# Patient Record
Sex: MALE | Race: WHITE | NOT HISPANIC OR LATINO | Employment: OTHER | URBAN - NONMETROPOLITAN AREA
[De-identification: names, ages, dates, MRNs, and addresses within clinical notes are randomized per-mention and may not be internally consistent; named-entity substitution may affect disease eponyms.]

---

## 2017-02-27 RX ORDER — LISINOPRIL AND HYDROCHLOROTHIAZIDE 20; 12.5 MG/1; MG/1
TABLET ORAL
Qty: 180 TABLET | Refills: 3 | Status: SHIPPED | OUTPATIENT
Start: 2017-02-27 | End: 2018-06-04 | Stop reason: SDUPTHER

## 2017-02-27 RX ORDER — PRAVASTATIN SODIUM 80 MG/1
TABLET ORAL
Qty: 90 TABLET | Refills: 3 | Status: SHIPPED | OUTPATIENT
Start: 2017-02-27 | End: 2018-06-04 | Stop reason: SDUPTHER

## 2017-04-14 RX ORDER — CITALOPRAM 40 MG/1
40 TABLET ORAL DAILY
Qty: 30 TABLET | Refills: 0 | Status: SHIPPED | OUTPATIENT
Start: 2017-04-14 | End: 2017-05-26 | Stop reason: SDUPTHER

## 2017-05-31 RX ORDER — GLIMEPIRIDE 4 MG/1
TABLET ORAL
Qty: 180 TABLET | Refills: 5 | Status: SHIPPED | OUTPATIENT
Start: 2017-05-31 | End: 2018-06-04 | Stop reason: SDUPTHER

## 2017-05-31 RX ORDER — CITALOPRAM 40 MG/1
TABLET ORAL
Qty: 90 TABLET | Refills: 0 | Status: SHIPPED | OUTPATIENT
Start: 2017-05-31 | End: 2017-08-29 | Stop reason: SDUPTHER

## 2017-09-08 RX ORDER — CITALOPRAM 40 MG/1
TABLET ORAL
Qty: 90 TABLET | Refills: 0 | Status: SHIPPED | OUTPATIENT
Start: 2017-09-08 | End: 2018-05-14 | Stop reason: ALTCHOICE

## 2017-12-07 RX ORDER — CITALOPRAM 40 MG/1
TABLET ORAL
Qty: 90 TABLET | Refills: 0 | OUTPATIENT
Start: 2017-12-07

## 2018-04-30 DIAGNOSIS — I10 ESSENTIAL HYPERTENSION: ICD-10-CM

## 2018-04-30 DIAGNOSIS — E78.5 HYPERLIPIDEMIA, UNSPECIFIED HYPERLIPIDEMIA TYPE: ICD-10-CM

## 2018-04-30 DIAGNOSIS — Z12.5 SPECIAL SCREENING FOR MALIGNANT NEOPLASM OF PROSTATE: ICD-10-CM

## 2018-04-30 DIAGNOSIS — E11.9 TYPE 2 DIABETES MELLITUS WITHOUT COMPLICATION, WITHOUT LONG-TERM CURRENT USE OF INSULIN (HCC): Primary | ICD-10-CM

## 2018-05-14 ENCOUNTER — OFFICE VISIT (OUTPATIENT)
Dept: FAMILY MEDICINE CLINIC | Facility: CLINIC | Age: 53
End: 2018-05-14

## 2018-05-14 ENCOUNTER — LAB (OUTPATIENT)
Dept: LAB | Facility: OTHER | Age: 53
End: 2018-05-14

## 2018-05-14 VITALS
HEIGHT: 60 IN | SYSTOLIC BLOOD PRESSURE: 104 MMHG | WEIGHT: 188.6 LBS | TEMPERATURE: 98.6 F | DIASTOLIC BLOOD PRESSURE: 68 MMHG | BODY MASS INDEX: 37.03 KG/M2 | HEART RATE: 92 BPM

## 2018-05-14 DIAGNOSIS — F41.1 GENERALIZED ANXIETY DISORDER: Chronic | ICD-10-CM

## 2018-05-14 DIAGNOSIS — E11.9 TYPE 2 DIABETES MELLITUS WITHOUT COMPLICATION, WITHOUT LONG-TERM CURRENT USE OF INSULIN (HCC): ICD-10-CM

## 2018-05-14 DIAGNOSIS — F10.11 HISTORY OF ALCOHOL ABUSE: Chronic | ICD-10-CM

## 2018-05-14 DIAGNOSIS — E78.5 HYPERLIPIDEMIA, UNSPECIFIED HYPERLIPIDEMIA TYPE: ICD-10-CM

## 2018-05-14 DIAGNOSIS — N52.9 VASCULOGENIC ERECTILE DYSFUNCTION, UNSPECIFIED VASCULOGENIC ERECTILE DYSFUNCTION TYPE: Chronic | ICD-10-CM

## 2018-05-14 DIAGNOSIS — E78.2 MIXED HYPERLIPIDEMIA: Chronic | ICD-10-CM

## 2018-05-14 DIAGNOSIS — E11.42 TYPE 2 DIABETES MELLITUS WITH DIABETIC POLYNEUROPATHY, WITHOUT LONG-TERM CURRENT USE OF INSULIN (HCC): Chronic | ICD-10-CM

## 2018-05-14 DIAGNOSIS — I10 ESSENTIAL HYPERTENSION: ICD-10-CM

## 2018-05-14 DIAGNOSIS — F33.2 SEVERE EPISODE OF RECURRENT MAJOR DEPRESSIVE DISORDER, WITHOUT PSYCHOTIC FEATURES (HCC): Primary | Chronic | ICD-10-CM

## 2018-05-14 DIAGNOSIS — I10 ESSENTIAL HYPERTENSION: Chronic | ICD-10-CM

## 2018-05-14 DIAGNOSIS — Z12.5 SPECIAL SCREENING FOR MALIGNANT NEOPLASM OF PROSTATE: ICD-10-CM

## 2018-05-14 PROBLEM — F32.9 MAJOR DEPRESSIVE DISORDER WITHOUT PSYCHOTIC FEATURES: Chronic | Status: ACTIVE | Noted: 2018-05-14

## 2018-05-14 LAB
ALBUMIN SERPL-MCNC: 4.1 G/DL (ref 3.2–5.5)
ALBUMIN UR-MCNC: 0.9 MG/L
ALBUMIN/GLOB SERPL: 1.3 G/DL (ref 1–3)
ALP SERPL-CCNC: 83 U/L (ref 15–121)
ALT SERPL W P-5'-P-CCNC: 18 U/L (ref 10–60)
ANION GAP SERPL CALCULATED.3IONS-SCNC: 13 MMOL/L (ref 5–15)
AST SERPL-CCNC: 19 U/L (ref 10–60)
BASOPHILS # BLD AUTO: 0.09 10*3/MM3 (ref 0–0.2)
BASOPHILS NFR BLD AUTO: 1.1 % (ref 0–2)
BILIRUB SERPL-MCNC: 1.1 MG/DL (ref 0.2–1)
BUN BLD-MCNC: 22 MG/DL (ref 8–25)
BUN/CREAT SERPL: 16.9 (ref 7–25)
CALCIUM SPEC-SCNC: 9.9 MG/DL (ref 8.4–10.8)
CHLORIDE SERPL-SCNC: 92 MMOL/L (ref 100–112)
CHOLEST SERPL-MCNC: 197 MG/DL (ref 150–200)
CO2 SERPL-SCNC: 27 MMOL/L (ref 20–32)
CREAT BLD-MCNC: 1.3 MG/DL (ref 0.4–1.3)
CREAT UR-MCNC: 85 MG/DL
DEPRECATED RDW RBC AUTO: 35.6 FL (ref 35.1–43.9)
EOSINOPHIL # BLD AUTO: 0.2 10*3/MM3 (ref 0–0.7)
EOSINOPHIL NFR BLD AUTO: 2.5 % (ref 0–7)
ERYTHROCYTE [DISTWIDTH] IN BLOOD BY AUTOMATED COUNT: 12.2 % (ref 11.5–14.5)
GFR SERPL CREATININE-BSD FRML MDRD: 58 ML/MIN/1.73 (ref 56–130)
GLOBULIN UR ELPH-MCNC: 3.1 GM/DL (ref 2.5–4.6)
GLUCOSE BLD-MCNC: 366 MG/DL (ref 70–100)
HBA1C MFR BLD: 14.3 % (ref 4–5.6)
HCT VFR BLD AUTO: 39.5 % (ref 39–49)
HDLC SERPL-MCNC: 49 MG/DL (ref 35–100)
HGB BLD-MCNC: 13.9 G/DL (ref 13.7–17.3)
LDLC SERPL CALC-MCNC: 105 MG/DL
LDLC/HDLC SERPL: 2.14 {RATIO}
LYMPHOCYTES # BLD AUTO: 2.21 10*3/MM3 (ref 0.6–4.2)
LYMPHOCYTES NFR BLD AUTO: 27.2 % (ref 10–50)
MCH RBC QN AUTO: 28.9 PG (ref 26.5–34)
MCHC RBC AUTO-ENTMCNC: 35.2 G/DL (ref 31.5–36.3)
MCV RBC AUTO: 82.1 FL (ref 80–98)
MICROALBUMIN/CREAT UR: 10.6 MG/G (ref 0–30)
MONOCYTES # BLD AUTO: 0.5 10*3/MM3 (ref 0–0.9)
MONOCYTES NFR BLD AUTO: 6.2 % (ref 0–12)
NEUTROPHILS # BLD AUTO: 5.13 10*3/MM3 (ref 2–8.6)
NEUTROPHILS NFR BLD AUTO: 63 % (ref 37–80)
PLATELET # BLD AUTO: 198 10*3/MM3 (ref 150–450)
PMV BLD AUTO: 10 FL (ref 8–12)
POTASSIUM BLD-SCNC: 4.3 MMOL/L (ref 3.4–5.4)
PROT SERPL-MCNC: 7.2 G/DL (ref 6.7–8.2)
RBC # BLD AUTO: 4.81 10*6/MM3 (ref 4.37–5.74)
SODIUM BLD-SCNC: 132 MMOL/L (ref 134–146)
TRIGL SERPL-MCNC: 216 MG/DL (ref 35–160)
VLDLC SERPL-MCNC: 43.2 MG/DL
WBC NRBC COR # BLD: 8.13 10*3/MM3 (ref 3.2–9.8)

## 2018-05-14 PROCEDURE — G0103 PSA SCREENING: HCPCS | Performed by: INTERNAL MEDICINE

## 2018-05-14 PROCEDURE — 83036 HEMOGLOBIN GLYCOSYLATED A1C: CPT | Performed by: INTERNAL MEDICINE

## 2018-05-14 PROCEDURE — 82570 ASSAY OF URINE CREATININE: CPT | Performed by: INTERNAL MEDICINE

## 2018-05-14 PROCEDURE — 82043 UR ALBUMIN QUANTITATIVE: CPT | Performed by: INTERNAL MEDICINE

## 2018-05-14 PROCEDURE — 80061 LIPID PANEL: CPT | Performed by: INTERNAL MEDICINE

## 2018-05-14 PROCEDURE — 84443 ASSAY THYROID STIM HORMONE: CPT | Performed by: INTERNAL MEDICINE

## 2018-05-14 PROCEDURE — 36415 COLL VENOUS BLD VENIPUNCTURE: CPT | Performed by: INTERNAL MEDICINE

## 2018-05-14 PROCEDURE — 85025 COMPLETE CBC W/AUTO DIFF WBC: CPT | Performed by: INTERNAL MEDICINE

## 2018-05-14 PROCEDURE — 99214 OFFICE O/P EST MOD 30 MIN: CPT | Performed by: INTERNAL MEDICINE

## 2018-05-14 PROCEDURE — 80053 COMPREHEN METABOLIC PANEL: CPT | Performed by: INTERNAL MEDICINE

## 2018-05-14 RX ORDER — ESCITALOPRAM OXALATE 20 MG/1
20 TABLET ORAL DAILY
Qty: 30 TABLET | Refills: 11 | Status: SHIPPED | OUTPATIENT
Start: 2018-05-14 | End: 2018-06-04 | Stop reason: SDUPTHER

## 2018-05-14 NOTE — PROGRESS NOTES
Subjective          History of Present Illness     Wesly Menezes is a 52 y.o. Male presents today to establish care.  He was a prior patient of Dr. Florentin Child.  His past medical history is significant for type 2 diabetes diagnosed approximately age 44, hyperlipidemia, hypertension, depression/OFELIA, and other medical issues.  He has stage II chronic kidney disease with baseline creatinine 1.3.  He describes mild symptoms of diabetic peripheral neuropathy, but declines medication for that.  He reports gradually worsening erectile dysfunction.    He works as a coal .  He attends [facility].    It appears that his diabetes has historically been poorly controlled.  Hemoglobin A1c has been greater than 10 for the past couple years.  Today's hemoglobin A1c is 14.3.  He reports compliance with metformin and Amaryl.  He has not been monitoring glucose regularly.  We have provided prescription for refills of his test strips today and asked him to start monitoring regularly.    His blood pressure is well controlled today.  He denies orthostatic symptoms.  He reports that he has lost about 60 pounds of weight over the course of the past year through diet changes and the stress of a divorce.    He reports a fairly long history of depression and anxiety.  He has been on Celexa for the past few years, but has been out of Celexa for the past 2 weeks.  He is tearful as we talk.  Makes good eye contact and has relatively good insight.  He denies SI or SA.  He denies bipolar cycling.  He denies auditory or visual hallucinations.  He contracts to do no harm.  He is being counseled by his new  at [facility] a regular basis.  His divorce has been very devastating to him.  He reports that he still wants to reconcile with his ex-wife.  He feels rather hopeless.  He is describing inadequate control of both depression and anxiety currently.  He sleeps fairly well on some nights, but not others.   "He has difficulty staying asleep at times.    He reports that he started drinking alcohol excessively when his mother-in-law and then mother both became terminally ill.  He reports that the heavy drinking continued and became a major problem in his marriage.  His wife left him when he was unable to keep his promises to stop drinking entirely.  When she did leave him, he finally stopped drinking.  He reports that he has maintained sobriety for 8 months now.  He has been on some clonazepam in the past, and I encouraged him to have a goal of avoiding use of benzodiazepines due to his history of alcohol abuse.    Review of Systems   Constitutional: Negative for chills, fatigue and fever.   HENT: Negative for congestion, ear pain, postnasal drip, sinus pressure and sore throat.    Respiratory: Negative for cough, shortness of breath and wheezing.    Cardiovascular: Negative for chest pain, palpitations and leg swelling.   Gastrointestinal: Negative for abdominal pain, blood in stool, constipation, diarrhea, nausea and vomiting.   Endocrine: Negative for cold intolerance, heat intolerance, polydipsia and polyuria.   Genitourinary: Negative for dysuria, frequency, hematuria and urgency.   Skin: Negative for rash.   Neurological: Negative for syncope and weakness.   Psychiatric/Behavioral: Positive for decreased concentration and dysphoric mood. The patient is nervous/anxious.         Objective     Vitals:    05/14/18 1506   BP: 104/68   Pulse: 92   Temp: 98.6 °F (37 °C)   TempSrc: Oral   Weight: 85.5 kg (188 lb 9.6 oz)   Height: 69 cm (27.17\")     Physical Exam   Constitutional: He is oriented to person, place, and time. He appears well-developed and well-nourished. No distress.   HENT:   Head: Normocephalic and atraumatic.   Nose: Right sinus exhibits no maxillary sinus tenderness and no frontal sinus tenderness. Left sinus exhibits no maxillary sinus tenderness and no frontal sinus tenderness.   Mouth/Throat: Uvula is " midline, oropharynx is clear and moist and mucous membranes are normal. No oral lesions. No tonsillar exudate.   Eyes: Conjunctivae and EOM are normal. Pupils are equal, round, and reactive to light.   Neck: Trachea normal. Neck supple. No JVD present. Carotid bruit is not present. No tracheal deviation present. No thyroid mass and no thyromegaly present.   Cardiovascular: Normal rate, regular rhythm, normal heart sounds and intact distal pulses.   No extrasystoles are present. PMI is not displaced.    No murmur heard.  Pulmonary/Chest: Effort normal and breath sounds normal. No accessory muscle usage. No respiratory distress. He has no decreased breath sounds. He has no wheezes. He has no rhonchi. He has no rales.   Abdominal: Soft. Bowel sounds are normal. He exhibits no distension. There is no hepatosplenomegaly. There is no tenderness.     Vascular Status -  His right foot exhibits normal foot vasculature  and no edema. His left foot exhibits normal foot vasculature  and no edema.  Lymphadenopathy:     He has no cervical adenopathy.   Neurological: He is alert and oriented to person, place, and time. No cranial nerve deficit. Coordination normal.   Skin: Skin is warm, dry and intact. No rash noted. No cyanosis. Nails show no clubbing.   Psychiatric: His speech is normal and behavior is normal. Judgment and thought content normal.   He looks and acts depressed and anxious.  He is not particularly agitated.  Makes good eye contact and is very  sincere and wanting to improve his current condition.  Reasonable insight.  No vasomotor retardation.  He is frequently tearful as we talk about his situation.   Vitals reviewed.          Assessment/Plan      Start Lexapro 20 mg.  Take one half tablet for the first 4 days, then 1 tablet daily.  Continue counseling with his .  Continue his current Bible study.  Refrain from all alcohol products.  We will likely add a second agent when he returns in 2 weeks, perhaps  Remeron or trazodone.  He contracts to seek immediate help should he start experiencing any thoughts of harming himself or others.  He does not think he needs any current inpatient treatment.    Continue the current dose of metformin and Amaryl.  Add Toujeo insulin 10 units every morning.  Monitor glucose twice daily and bring diabetic diary when he returns in 2 weeks.  I would like to check a C-peptide in the future.    Continue the current dose of lisinopril HCT.  At his current lower body weight, a dose reduction may be indicated soon.    Continue the pravastatin and dietary efforts.    I gave him samples of Cialis.  If they are helpful, we will provide a prescription for an ED drug when he returns in 2 weeks.    Return to clinic in 2 weeks with diabetic diary.  The patient appears to be overdue for health maintenance issues such as baseline colonoscopy.  We will want to address that in the future.    Scribed for Dr. Cadena by Yoko Johnson [facility].     Diagnoses and all orders for this visit:    Severe episode of recurrent major depressive disorder, without psychotic features    Generalized anxiety disorder    Type 2 diabetes mellitus with diabetic polyneuropathy, without long-term current use of insulin  -     Microalbumin / Creatinine Urine Ratio - Urine, Clean Catch; Future  -     TSH; Future    Essential hypertension    Mixed hyperlipidemia    Other orders  -     escitalopram (LEXAPRO) 20 MG tablet; Take 1 tablet by mouth Daily.        No visits with results within 3 Week(s) from this visit.   Latest known visit with results is:   Hospital Outpatient Visit on 06/20/2016   Component Date Value Ref Range Status   • WBC 06/20/2016 9.9* 3.2 - 9.8 x1000/uL Final   • RBC 06/20/2016 4.42  4.37 - 5.74 cassie/mm3 Final   • Hemoglobin 06/20/2016 13.9  13.7 - 17.3 gm/dl Final   • Hematocrit 06/20/2016 38.2* 39.0 - 49.0 % Final   • MCV 06/20/2016 86.4  80.0 - 98.0 fl Final   • MCH 06/20/2016 31.4  26.0 - 34.0 pg Final    • MCHC 06/20/2016 36.4* 31.5 - 36.3 gm/dl Final   • Platelets 06/20/2016 208  150 - 450 x1000/mm3 Final   • RDW 06/20/2016 12.0  11.5 - 14.5 % Final   • MPV 06/20/2016 10.5  8.0 - 12.0 fl Final   • Neutrophil Rel % 06/20/2016 78.2  37.0 - 80.0 % Final   • Lymphocyte Rel % 06/20/2016 14.9  10.0 - 50.0 % Final   • Monocyte Rel % 06/20/2016 5.5  0.0 - 12.0 % Final   • Eosinophil Rel % 06/20/2016 0.7  0.0 - 7.0 % Final   • Basophil Rel % 06/20/2016 0.7  0.0 - 2.0 % Final   • nRBC 06/20/2016 0   Final   • nRBC 06/20/2016 0   Final   • Protein, UA 06/20/2016 NEGATIVE  NEGATIVE Final   • Glucose 06/20/2016 412* 70.0 - 100.0 mg/dl Final   • BUN 06/20/2016 30* 8.0 - 25.0 mg/dl Final   • Creatinine 06/20/2016 1.6* 0.4 - 1.3 mg/dl Final   • Sodium 06/20/2016 134.0  134 - 146 mmol/L Final   • Potassium 06/20/2016 6.0* 3.4 - 5.4 mmol/L Final   • Chloride 06/20/2016 96.0* 100.0 - 112.0 mmol/L Final   • CO2 06/20/2016 26.0  20.0 - 32.0 mmol/L Final   • Calcium 06/20/2016 10.3  8.4 - 10.8 mg/dl Final   • Total Protein 06/20/2016 8.0  6.7 - 8.2 gm/dl Final   • Albumin 06/20/2016 4.5  3.2 - 5.5 gm/dl Final   • Total Bilirubin 06/20/2016 1.3* 0.2 - 1.0 mg/dl Final   • Alkaline Phosphatase 06/20/2016 77  15 - 121 U/L Final   • ALT (SGPT) 06/20/2016 39  10 - 60 U/L Final   • AST (SGOT) 06/20/2016 39  10 - 60 U/L Final   • GFR MDRD Non  06/20/2016 46* 56 - 130 mL/min/1.73 sq.M Final    Comment: Invalid if creatinine is changing or the patient is on dialysis. Use AA  result if patient is -American, non AA result otherwise.     • GFR MDRD  06/20/2016 55* 56 - 130 mL/min/1.73 sq.M Final   • Anion Gap 06/20/2016 12.0  5.0 - 15.0 mmol/L Final   • Total Cholesterol 06/20/2016 197  150 - 200 mg/dl Final   • Triglycerides 06/20/2016 322* 35 - 160 mg/dl Final   • HDL Cholesterol 06/20/2016 42.3  35.0 - 100.0 mg/dl Final   • LDL Cholesterol  06/20/2016 90  mg/dl Final    Comment: LDL DESIRED: < 130  MG/DL  LDL DESIRED: < 130 MG/DL     • Free T4 06/21/2016 1.41  0.78 - 2.19 ng/dl Final   • Barbiturates Screen, Urine 06/21/2016 Negative  Negative Final   • Benzodiazepine Screen, Urine 06/21/2016 Negative  Negative Final   • Opiate Screen, Urine 06/21/2016 Negative  Negative Final   • THC Screen Interpretation 06/21/2016 Negative  Negative Final   • Amphet/Methamphet, Screen, Urine 06/21/2016 Negative  Negative Final   • Cocaine Screen, Urine 06/21/2016 Negative  Negative Final   • Oxycodone Screen, Urine 06/21/2016 Negative  Negative Final   • Methadone Screen, Urine 06/21/2016 Negative  Negative Final    Comment: CUTOFF VALUES  Benzo     200 ng/mL   Aslhey        200 ng/mL           Cocaine   150 ng/mL   Amph      500 ng/mL  THC          20 ng/mL  Opiates    300 ng/mL  Oxycod    100 ng/mL  Mthadone 300 ng/mL       • TSH 06/21/2016 2.93  0.46 - 4.68 uIU/ml Final   • Hemoglobin A1C 06/21/2016 12.6* 4.0 - 5.6 %TotHgb Final   ]

## 2018-05-15 ENCOUNTER — TELEPHONE (OUTPATIENT)
Dept: FAMILY MEDICINE CLINIC | Facility: CLINIC | Age: 53
End: 2018-05-15

## 2018-05-15 DIAGNOSIS — Z79.4 TYPE 2 DIABETES MELLITUS WITHOUT COMPLICATION, WITH LONG-TERM CURRENT USE OF INSULIN (HCC): Primary | ICD-10-CM

## 2018-05-15 DIAGNOSIS — E11.9 TYPE 2 DIABETES MELLITUS WITHOUT COMPLICATION, WITH LONG-TERM CURRENT USE OF INSULIN (HCC): Primary | ICD-10-CM

## 2018-05-15 PROBLEM — N52.9 VASCULOGENIC ERECTILE DYSFUNCTION: Chronic | Status: ACTIVE | Noted: 2018-05-15

## 2018-05-15 PROBLEM — F10.11 HISTORY OF ALCOHOL ABUSE: Chronic | Status: ACTIVE | Noted: 2018-05-15

## 2018-05-15 LAB
PSA SERPL-MCNC: 0.47 NG/ML (ref 0–4)
TSH SERPL DL<=0.05 MIU/L-ACNC: 2.49 MIU/ML (ref 0.46–4.68)

## 2018-05-15 RX ORDER — BLOOD-GLUCOSE METER
KIT MISCELLANEOUS
Qty: 1 EACH | Refills: 0 | Status: SHIPPED | OUTPATIENT
Start: 2018-05-15 | End: 2018-06-22 | Stop reason: SDUPTHER

## 2018-05-15 NOTE — TELEPHONE ENCOUNTER
Explained results to patient and new instructions. He will  sample of Toujeo and I will send in [facility] to [facility]. Sent script for new glucometer and test strips to [facility]. Faxed a copy of insurance card to [facility] per patient request. TP    ----- Message from Jono Cadena MD sent at 5/15/2018 12:43 PM CDT -----  Notify the patient that his blood sugar averages even higher than it was last year.  Glucose has been averaging about 300 for the past 3 months.  Continue the current dose of metformin and Amaryl.  Add Toujeo 10 units every morning.  See if he can come by so that you can demonstrate how to use it properly.  Have him monitor glucose twice daily and bring diabetic diary when he returns in 2-3 weeks.  EB

## 2018-06-04 ENCOUNTER — OFFICE VISIT (OUTPATIENT)
Dept: FAMILY MEDICINE CLINIC | Facility: CLINIC | Age: 53
End: 2018-06-04

## 2018-06-04 VITALS
HEIGHT: 69 IN | HEART RATE: 82 BPM | SYSTOLIC BLOOD PRESSURE: 110 MMHG | OXYGEN SATURATION: 98 % | WEIGHT: 193 LBS | BODY MASS INDEX: 28.58 KG/M2 | DIASTOLIC BLOOD PRESSURE: 74 MMHG

## 2018-06-04 DIAGNOSIS — F33.2 SEVERE EPISODE OF RECURRENT MAJOR DEPRESSIVE DISORDER, WITHOUT PSYCHOTIC FEATURES (HCC): Chronic | ICD-10-CM

## 2018-06-04 DIAGNOSIS — E11.42 TYPE 2 DIABETES MELLITUS WITH DIABETIC POLYNEUROPATHY, WITHOUT LONG-TERM CURRENT USE OF INSULIN (HCC): Primary | Chronic | ICD-10-CM

## 2018-06-04 DIAGNOSIS — I10 ESSENTIAL HYPERTENSION: Chronic | ICD-10-CM

## 2018-06-04 DIAGNOSIS — N52.9 VASCULOGENIC ERECTILE DYSFUNCTION, UNSPECIFIED VASCULOGENIC ERECTILE DYSFUNCTION TYPE: Chronic | ICD-10-CM

## 2018-06-04 DIAGNOSIS — E78.2 MIXED HYPERLIPIDEMIA: Chronic | ICD-10-CM

## 2018-06-04 DIAGNOSIS — F41.1 GENERALIZED ANXIETY DISORDER: Chronic | ICD-10-CM

## 2018-06-04 PROCEDURE — 99214 OFFICE O/P EST MOD 30 MIN: CPT | Performed by: INTERNAL MEDICINE

## 2018-06-04 RX ORDER — PRAVASTATIN SODIUM 80 MG/1
80 TABLET ORAL DAILY
Qty: 90 TABLET | Refills: 3 | Status: SHIPPED | OUTPATIENT
Start: 2018-06-04 | End: 2018-06-22 | Stop reason: SDUPTHER

## 2018-06-04 RX ORDER — ESCITALOPRAM OXALATE 20 MG/1
20 TABLET ORAL DAILY
Qty: 30 TABLET | Refills: 11 | Status: SHIPPED | OUTPATIENT
Start: 2018-06-04 | End: 2018-06-22 | Stop reason: SDUPTHER

## 2018-06-04 RX ORDER — SILDENAFIL 100 MG/1
100 TABLET, FILM COATED ORAL DAILY PRN
Qty: 6 TABLET | Refills: 12 | Status: SHIPPED | OUTPATIENT
Start: 2018-06-04 | End: 2018-06-04 | Stop reason: ALTCHOICE

## 2018-06-04 RX ORDER — LISINOPRIL AND HYDROCHLOROTHIAZIDE 20; 12.5 MG/1; MG/1
1 TABLET ORAL 2 TIMES DAILY
Qty: 180 TABLET | Refills: 3 | Status: SHIPPED | OUTPATIENT
Start: 2018-06-04 | End: 2018-06-22 | Stop reason: SDUPTHER

## 2018-06-04 RX ORDER — GLIMEPIRIDE 4 MG/1
4 TABLET ORAL 2 TIMES DAILY
Qty: 180 TABLET | Refills: 5 | Status: SHIPPED | OUTPATIENT
Start: 2018-06-04 | End: 2018-06-22 | Stop reason: SDUPTHER

## 2018-06-04 RX ORDER — SILDENAFIL CITRATE 20 MG/1
TABLET ORAL
Qty: 12 TABLET | Refills: 11 | Status: SHIPPED | OUTPATIENT
Start: 2018-06-04 | End: 2018-06-22 | Stop reason: SDUPTHER

## 2018-06-04 NOTE — PROGRESS NOTES
Subjective        History of Present Illness     Wesly Menezes is a 52 y.o. male who comes in today for a follow up on issues addressed at his initial visit.  He was a prior patient of Dr. Florentin Child and established care here three weeks ago.  His medical issues include type 2 diabetes, hyperlipidemia, hypertension, depression/OFELIA, and other medical issues.  He has stage II chronic kidney disease with baseline creatinine 1.3.      His diabetes has historically been poorly controlled with hemoglobin A1c greater than 10 for the past couple years.  Most current hemoglobin A1c dated 05/14/18 was 14.3.  When he was here three weeks ago, I had him continue metformin and high-dose Amaryl and I added Toujeo insulin 10 units every morning.  He did not bring in a diary today, but reports range of 132-188 in the morning with further improvement later in the day.  He is feeling much better.  He eats cereal with milk at night as a snack and reports he notices consistent elevations each morning.  He also reports eating a high carbohydrate breakfast such as blueberry muffin.  We reviewed principles of diabetic diet.       We started him on Lexapro for anxiety, grief, and depression with his last visit and he reports he has already noticed more than 50% improvement and is sleeping well.  We discussed how he may not see full results with the Lexapro for 2-3 more weeks.  He has been attending Sikhism and participating in Bible Study.  He is receiving counseling from his .  He attends [facility].     He reports good results with the samples of Cialis we gave at his initial visit for erectile dysfunction.  We are giving him a prescription for sildenafil today.                Review of Systems   Constitutional: Negative for chills, fatigue and fever.   HENT: Negative for congestion, ear pain, postnasal drip, sinus pressure and sore throat.    Respiratory: Negative for cough, shortness of breath and wheezing.   "  Cardiovascular: Negative for chest pain, palpitations and leg swelling.   Gastrointestinal: Negative for abdominal pain, blood in stool, constipation, diarrhea, nausea and vomiting.   Endocrine: Negative for cold intolerance, heat intolerance, polydipsia and polyuria.   Genitourinary: Negative for dysuria, frequency, hematuria and urgency.   Skin: Negative for rash.   Neurological: Negative for syncope and weakness.        Objective     Vitals:    06/04/18 1546   BP: 110/74   Pulse: 82   SpO2: 98%   Weight: 87.5 kg (193 lb)   Height: 175.3 cm (69\")     Physical Exam   Constitutional: He is oriented to person, place, and time. He appears well-developed and well-nourished. No distress.   HENT:   Head: Normocephalic and atraumatic.   Nose: Right sinus exhibits no maxillary sinus tenderness and no frontal sinus tenderness. Left sinus exhibits no maxillary sinus tenderness and no frontal sinus tenderness.   Mouth/Throat: Uvula is midline, oropharynx is clear and moist and mucous membranes are normal. No oral lesions. No tonsillar exudate.   Eyes: Conjunctivae and EOM are normal. Pupils are equal, round, and reactive to light.   Neck: Trachea normal. Neck supple. No JVD present. Carotid bruit is not present. No tracheal deviation present. No thyroid mass and no thyromegaly present.   Cardiovascular: Normal rate, regular rhythm, normal heart sounds and intact distal pulses.   No extrasystoles are present. PMI is not displaced.    No murmur heard.  Pulmonary/Chest: Effort normal and breath sounds normal. No accessory muscle usage. No respiratory distress. He has no decreased breath sounds. He has no wheezes. He has no rhonchi. He has no rales.   Abdominal: Soft. Bowel sounds are normal. He exhibits no distension. There is no hepatosplenomegaly. There is no tenderness.     Vascular Status -  His right foot exhibits normal foot vasculature  and no edema. His left foot exhibits normal foot vasculature  and no " edema.  Lymphadenopathy:     He has no cervical adenopathy.   Neurological: He is alert and oriented to person, place, and time. No cranial nerve deficit. Coordination normal.   Skin: Skin is warm, dry and intact. No rash noted. No cyanosis. Nails show no clubbing.   Psychiatric: He has a normal mood and affect. His speech is normal and behavior is normal. Judgment and thought content normal.   Vitals reviewed.      Assessment/Plan      Continue Lexapro 20 mg. Continue counseling and Bible study.  Refrain from all alcohol products.    Continue the current dose of metformin and Amaryl.  Continue Toujeo insulin 10 units every morning.  I will ultimately want to decrease Amaryl and perhaps add other oral agents.   Monitor glucose twice daily and keep diabetic diary.  He will return in two months with diabetic diary.  I want to check a C-peptide with his next set of labs.    Start sildenafil 20 mg 2-3 tablets one hour prior to intercourse as needed for erectile dysfunction.      Scribed for Dr. Cadena by Yoko Johnson [facility].     Diagnoses and all orders for this visit:    Type 2 diabetes mellitus with diabetic polyneuropathy, without long-term current use of insulin  -     C-Peptide; Future  -     CBC Auto Differential; Future  -     Comprehensive Metabolic Panel; Future  -     Hemoglobin A1c; Future    Essential hypertension    Generalized anxiety disorder    Severe episode of recurrent major depressive disorder, without psychotic features    Vasculogenic erectile dysfunction, unspecified vasculogenic erectile dysfunction type    Mixed hyperlipidemia  -     Lipid Panel; Future    Other orders  -     Discontinue: sildenafil (VIAGRA) 100 MG tablet; Take 1 tablet by mouth Daily As Needed for erectile dysfunction.  -     glimepiride (AMARYL) 4 MG tablet; Take 1 tablet by mouth 2 (Two) Times a Day.  -     lisinopril-hydrochlorothiazide (PRINZIDE,ZESTORETIC) 20-12.5 MG per tablet; Take 1 tablet by mouth 2 (Two) Times a  Day.  -     metFORMIN (GLUCOPHAGE) 1000 MG tablet; Take 1 tablet by mouth 2 (Two) Times a Day.  -     pravastatin (PRAVACHOL) 80 MG tablet; Take 1 tablet by mouth Daily.  -     Insulin Glargine 300 UNIT/ML solution pen-injector; Inject 10 Units under the skin Every Morning.  -     escitalopram (LEXAPRO) 20 MG tablet; Take 1 tablet by mouth Daily.  -     sildenafil (REVATIO) 20 MG tablet; 2-3 tablets by mouth 1 hour prior to intercourse        No visits with results within 3 Week(s) from this visit.   Latest known visit with results is:   Lab on 05/14/2018   Component Date Value Ref Range Status   • Microalbumin/Creatinine Ratio 05/14/2018 10.6  0.0 - 30.0 mg/g Final   • Creatinine, Urine 05/14/2018 85.0  mg/dL Final   • Microalbumin, Urine 05/14/2018 0.9  mg/L Final   • TSH 05/14/2018 2.490  0.460 - 4.680 mIU/mL Final   • Glucose 05/14/2018 366* 70 - 100 mg/dL Final   • BUN 05/14/2018 22  8 - 25 mg/dL Final   • Creatinine 05/14/2018 1.30  0.40 - 1.30 mg/dL Final   • Sodium 05/14/2018 132* 134 - 146 mmol/L Final   • Potassium 05/14/2018 4.3  3.4 - 5.4 mmol/L Final   • Chloride 05/14/2018 92* 100 - 112 mmol/L Final   • CO2 05/14/2018 27.0  20.0 - 32.0 mmol/L Final   • Calcium 05/14/2018 9.9  8.4 - 10.8 mg/dL Final   • Total Protein 05/14/2018 7.2  6.7 - 8.2 g/dL Final   • Albumin 05/14/2018 4.10  3.20 - 5.50 g/dL Final   • ALT (SGPT) 05/14/2018 18  10 - 60 U/L Final   • AST (SGOT) 05/14/2018 19  10 - 60 U/L Final   • Alkaline Phosphatase 05/14/2018 83  15 - 121 U/L Final   • Total Bilirubin 05/14/2018 1.1* 0.2 - 1.0 mg/dL Final   • eGFR Non African Amer 05/14/2018 58  56 - 130 mL/min/1.73 Final   • Globulin 05/14/2018 3.1  2.5 - 4.6 gm/dL Final   • A/G Ratio 05/14/2018 1.3  1.0 - 3.0 g/dL Final   • BUN/Creatinine Ratio 05/14/2018 16.9  7.0 - 25.0 Final   • Anion Gap 05/14/2018 13.0  5.0 - 15.0 mmol/L Final   • Hemoglobin A1C 05/14/2018 14.3* 4 - 5.6 % Final   • Total Cholesterol 05/14/2018 197  150 - 200 mg/dL Final    • Triglycerides 05/14/2018 216* 35 - 160 mg/dL Final   • HDL Cholesterol 05/14/2018 49  35 - 100 mg/dL Final   • LDL Cholesterol  05/14/2018 105  mg/dL Final   • VLDL Cholesterol 05/14/2018 43.2  mg/dL Final   • LDL/HDL Ratio 05/14/2018 2.14   Final   • PSA 05/14/2018 0.475  0.000 - 4.000 ng/mL Final   • WBC 05/14/2018 8.13  3.20 - 9.80 10*3/mm3 Final   • RBC 05/14/2018 4.81  4.37 - 5.74 10*6/mm3 Final   • Hemoglobin 05/14/2018 13.9  13.7 - 17.3 g/dL Final   • Hematocrit 05/14/2018 39.5  39.0 - 49.0 % Final   • MCV 05/14/2018 82.1  80.0 - 98.0 fL Final   • MCH 05/14/2018 28.9  26.5 - 34.0 pg Final   • MCHC 05/14/2018 35.2  31.5 - 36.3 g/dL Final   • RDW 05/14/2018 12.2  11.5 - 14.5 % Final   • RDW-SD 05/14/2018 35.6  35.1 - 43.9 fl Final   • MPV 05/14/2018 10.0  8.0 - 12.0 fL Final   • Platelets 05/14/2018 198  150 - 450 10*3/mm3 Final   • Neutrophil % 05/14/2018 63.0  37.0 - 80.0 % Final   • Lymphocyte % 05/14/2018 27.2  10.0 - 50.0 % Final   • Monocyte % 05/14/2018 6.2  0.0 - 12.0 % Final   • Eosinophil % 05/14/2018 2.5  0.0 - 7.0 % Final   • Basophil % 05/14/2018 1.1  0.0 - 2.0 % Final   • Neutrophils, Absolute 05/14/2018 5.13  2.00 - 8.60 10*3/mm3 Final   • Lymphocytes, Absolute 05/14/2018 2.21  0.60 - 4.20 10*3/mm3 Final   • Monocytes, Absolute 05/14/2018 0.50  0.00 - 0.90 10*3/mm3 Final   • Eosinophils, Absolute 05/14/2018 0.20  0.00 - 0.70 10*3/mm3 Final   • Basophils, Absolute 05/14/2018 0.09  0.00 - 0.20 10*3/mm3 Final   ]

## 2018-06-22 DIAGNOSIS — Z79.4 TYPE 2 DIABETES MELLITUS WITHOUT COMPLICATION, WITH LONG-TERM CURRENT USE OF INSULIN (HCC): ICD-10-CM

## 2018-06-22 DIAGNOSIS — E11.9 TYPE 2 DIABETES MELLITUS WITHOUT COMPLICATION, WITH LONG-TERM CURRENT USE OF INSULIN (HCC): ICD-10-CM

## 2018-06-22 RX ORDER — PRAVASTATIN SODIUM 80 MG/1
80 TABLET ORAL DAILY
Qty: 90 TABLET | Refills: 3 | Status: SHIPPED | OUTPATIENT
Start: 2018-06-22 | End: 2018-06-22 | Stop reason: SDUPTHER

## 2018-06-22 RX ORDER — GLIMEPIRIDE 4 MG/1
4 TABLET ORAL 2 TIMES DAILY
Qty: 180 TABLET | Refills: 1 | Status: SHIPPED | OUTPATIENT
Start: 2018-06-22 | End: 2018-06-22 | Stop reason: SDUPTHER

## 2018-06-22 RX ORDER — SILDENAFIL CITRATE 20 MG/1
TABLET ORAL
Qty: 12 TABLET | Refills: 11 | Status: SHIPPED | OUTPATIENT
Start: 2018-06-22 | End: 2019-02-26 | Stop reason: SDUPTHER

## 2018-06-22 RX ORDER — GLIMEPIRIDE 4 MG/1
4 TABLET ORAL 2 TIMES DAILY
Qty: 60 TABLET | Refills: 1 | Status: SHIPPED | OUTPATIENT
Start: 2018-06-22 | End: 2018-09-25 | Stop reason: SDUPTHER

## 2018-06-22 RX ORDER — LISINOPRIL AND HYDROCHLOROTHIAZIDE 20; 12.5 MG/1; MG/1
1 TABLET ORAL 2 TIMES DAILY
Qty: 180 TABLET | Refills: 3 | Status: SHIPPED | OUTPATIENT
Start: 2018-06-22 | End: 2018-06-22 | Stop reason: SDUPTHER

## 2018-06-22 RX ORDER — BLOOD-GLUCOSE METER
KIT MISCELLANEOUS
Qty: 1 EACH | Refills: 0 | Status: SHIPPED | OUTPATIENT
Start: 2018-06-22 | End: 2020-07-16 | Stop reason: SDUPTHER

## 2018-06-22 RX ORDER — ESCITALOPRAM OXALATE 20 MG/1
20 TABLET ORAL DAILY
Qty: 90 TABLET | Refills: 1 | Status: SHIPPED | OUTPATIENT
Start: 2018-06-22 | End: 2018-06-22 | Stop reason: SDUPTHER

## 2018-06-22 RX ORDER — PRAVASTATIN SODIUM 80 MG/1
80 TABLET ORAL DAILY
Qty: 30 TABLET | Refills: 0 | Status: SHIPPED | OUTPATIENT
Start: 2018-06-22 | End: 2018-08-24 | Stop reason: SDUPTHER

## 2018-06-22 RX ORDER — ESCITALOPRAM OXALATE 20 MG/1
20 TABLET ORAL DAILY
Qty: 30 TABLET | Refills: 0 | Status: SHIPPED | OUTPATIENT
Start: 2018-06-22 | End: 2019-02-26 | Stop reason: SDUPTHER

## 2018-06-22 RX ORDER — LISINOPRIL AND HYDROCHLOROTHIAZIDE 20; 12.5 MG/1; MG/1
1 TABLET ORAL 2 TIMES DAILY
Qty: 60 TABLET | Refills: 0 | Status: SHIPPED | OUTPATIENT
Start: 2018-06-22 | End: 2018-08-24 | Stop reason: SDUPTHER

## 2018-06-22 RX ORDER — SILDENAFIL CITRATE 20 MG/1
TABLET ORAL
Qty: 12 TABLET | Refills: 11 | Status: SHIPPED | OUTPATIENT
Start: 2018-06-22 | End: 2018-08-24 | Stop reason: SDUPTHER

## 2018-08-24 ENCOUNTER — OFFICE VISIT (OUTPATIENT)
Dept: FAMILY MEDICINE CLINIC | Facility: CLINIC | Age: 53
End: 2018-08-24

## 2018-08-24 VITALS
HEART RATE: 67 BPM | SYSTOLIC BLOOD PRESSURE: 146 MMHG | BODY MASS INDEX: 31.25 KG/M2 | WEIGHT: 211 LBS | DIASTOLIC BLOOD PRESSURE: 78 MMHG | HEIGHT: 69 IN | TEMPERATURE: 98 F

## 2018-08-24 DIAGNOSIS — E66.09 CLASS 1 OBESITY DUE TO EXCESS CALORIES WITH SERIOUS COMORBIDITY AND BODY MASS INDEX (BMI) OF 31.0 TO 31.9 IN ADULT: Chronic | ICD-10-CM

## 2018-08-24 DIAGNOSIS — F33.2 SEVERE EPISODE OF RECURRENT MAJOR DEPRESSIVE DISORDER, WITHOUT PSYCHOTIC FEATURES (HCC): Chronic | ICD-10-CM

## 2018-08-24 DIAGNOSIS — E11.42 TYPE 2 DIABETES MELLITUS WITH DIABETIC POLYNEUROPATHY, WITHOUT LONG-TERM CURRENT USE OF INSULIN (HCC): Primary | Chronic | ICD-10-CM

## 2018-08-24 DIAGNOSIS — G89.29 CHRONIC BILATERAL LOW BACK PAIN WITHOUT SCIATICA: Chronic | ICD-10-CM

## 2018-08-24 DIAGNOSIS — E11.42 TYPE 2 DIABETES MELLITUS WITH DIABETIC POLYNEUROPATHY, WITHOUT LONG-TERM CURRENT USE OF INSULIN (HCC): Chronic | ICD-10-CM

## 2018-08-24 DIAGNOSIS — I10 ESSENTIAL HYPERTENSION: Chronic | ICD-10-CM

## 2018-08-24 DIAGNOSIS — M54.50 CHRONIC BILATERAL LOW BACK PAIN WITHOUT SCIATICA: Chronic | ICD-10-CM

## 2018-08-24 DIAGNOSIS — R73.9 HYPERGLYCEMIA: ICD-10-CM

## 2018-08-24 DIAGNOSIS — F41.1 GENERALIZED ANXIETY DISORDER: Chronic | ICD-10-CM

## 2018-08-24 DIAGNOSIS — F10.11 HISTORY OF ALCOHOL ABUSE: Chronic | ICD-10-CM

## 2018-08-24 PROCEDURE — 99214 OFFICE O/P EST MOD 30 MIN: CPT | Performed by: INTERNAL MEDICINE

## 2018-08-24 RX ORDER — PRAVASTATIN SODIUM 80 MG/1
80 TABLET ORAL DAILY
Qty: 30 TABLET | Refills: 5 | Status: SHIPPED | OUTPATIENT
Start: 2018-08-24 | End: 2018-08-24 | Stop reason: SDUPTHER

## 2018-08-24 RX ORDER — NAPROXEN 500 MG/1
500 TABLET ORAL 2 TIMES DAILY WITH MEALS
Qty: 60 TABLET | Refills: 3 | Status: SHIPPED | OUTPATIENT
Start: 2018-08-24 | End: 2019-01-04 | Stop reason: SDUPTHER

## 2018-08-24 RX ORDER — LISINOPRIL AND HYDROCHLOROTHIAZIDE 20; 12.5 MG/1; MG/1
1 TABLET ORAL 2 TIMES DAILY
Qty: 60 TABLET | Refills: 5 | Status: SHIPPED | OUTPATIENT
Start: 2018-08-24 | End: 2019-02-26 | Stop reason: SDUPTHER

## 2018-08-24 RX ORDER — PRAVASTATIN SODIUM 80 MG/1
80 TABLET ORAL DAILY
Qty: 30 TABLET | Refills: 5 | Status: SHIPPED | OUTPATIENT
Start: 2018-08-24 | End: 2019-02-26 | Stop reason: SDUPTHER

## 2018-08-24 NOTE — PROGRESS NOTES
Subjective     History of Present Illness     Wesly Menezes is a 52 y.o. male with medical issues include type 2 diabetes, hyperlipidemia, hypertension, depression/OFELIA, and other medical issues who comes in for a 2-month follow up on diabetes mellitus.  He established care in May with historically poorly controlled diabetes.  Hemoglobin A1c has been greater than 10 for the past couple years and  A1c was 14.3 with labs at his initial visit in May.  I had him continue the current dose of metformin and Amaryl twice daily.  I added low dose Toujeo insulin 10 units every morning.   He reports compliance with medications and denies hypoglycemic episodes.  I asked him to keep a glucose diary and he comes in today to review diabetic diary.  Review of the diary reveals glucose is typically higher in the morning and lower in the evening with erractic morning numbers ranging from 121 to 246 with average of 175.  Evening numbers have been well controlled averaging in the 90s.  He admits that he sometimes misses doses of his medications, which usually results in the a.m. numbers above goal.  He eats the majority of calories later in the day.  I recommended he try dosing his Toujeo with his evening meal.  Weight is increased 18 pounds in the past two months.   We discussed the need to manage weight to help get diabetes at goal.  I recommended he weigh weekly and set a weight goal of under 200 pounds.      He reports anxiety, grief, and depression symptoms have been adequately controlled with the Lexapro 20 mg started for anxiety at his initial visit in May.       He has been taking two Aleve three times a day for chronic low back and right shoulder pain.  He denies radicular symptoms.  He has noticed occasional constipation.  He denies upper GI side effects.  He is asking if there is an alternative pain reliever he could take.  We are sending a prescription for naproxen sodium.          Blood pressure is at goal.     Review of  "Systems   Constitutional: Negative for chills, fatigue and fever.   HENT: Negative for congestion, ear pain, postnasal drip, sinus pressure and sore throat.    Respiratory: Negative for cough, shortness of breath and wheezing.    Cardiovascular: Negative for chest pain, palpitations and leg swelling.   Gastrointestinal: Negative for abdominal pain, blood in stool, constipation, diarrhea, nausea and vomiting.   Endocrine: Negative for cold intolerance, heat intolerance, polydipsia and polyuria.   Genitourinary: Negative for dysuria, frequency, hematuria and urgency.   Musculoskeletal: Positive for back pain.   Skin: Negative for rash.   Neurological: Negative for syncope and weakness.   Psychiatric/Behavioral: The patient is nervous/anxious.         Objective      Vitals:    08/24/18 1447   BP: 146/78   Pulse: 67   Temp: 98 °F (36.7 °C)   TempSrc: Oral   Weight: 95.7 kg (211 lb)   Height: 175.3 cm (69\")       Physical Exam   Constitutional: He is oriented to person, place, and time. He appears well-developed and well-nourished. No distress.   Obese male.    HENT:   Head: Normocephalic and atraumatic.   Nose: Nose normal.   Mouth/Throat: Oropharynx is clear and moist. No oropharyngeal exudate.   Eyes: Pupils are equal, round, and reactive to light. EOM are normal.   Neck: Neck supple. No JVD present. No thyromegaly present.   Cardiovascular: Normal rate, regular rhythm and normal heart sounds.    Pulmonary/Chest: Effort normal and breath sounds normal. No accessory muscle usage. No respiratory distress. He has no wheezes. He has no rales.   Abdominal: Soft. Bowel sounds are normal. He exhibits no distension. There is no tenderness.   Obese abdomen limits exam.    Musculoskeletal: He exhibits no edema.   Lymphadenopathy:     He has no cervical adenopathy.   Neurological: He is alert and oriented to person, place, and time. No cranial nerve deficit.   Psychiatric: He has a normal mood and affect. His speech is normal " and behavior is normal. Judgment and thought content normal.         Assessment/Plan      Continue with the current diabetic regimen including metformin, Amaryl, and low dose Toujeo 10 units daily.   He has been taking the Toujeo each morning.   However, I asked him to start taking the Toujeo with his evening meal to lower his morning glucose.  Continue to monitor glucose closely.  Pursue diet, exercise, and weight loss.  Weigh weekly with a goal of getting weight down to a goal of 200 pounds.        Continue with the Lexapro 20 mg daily.     A prescription is sent for naproxen sodium 500 mg to take one b.i.d with meals for the chronic back and shoulder pain.  He should avoid other NSAIDs while on the naproxen.       Continue other medications and return in three months for routine follow up with fasting labs one week prior.     Scribed for Dr. Cadena by Yoko Johnson [facility].     Diagnoses and all orders for this visit:    Type 2 diabetes mellitus with diabetic polyneuropathy, without long-term current use of insulin (CMS/[facility])  -     glucose blood test strip; Check two time daily E11.9  -     Insulin Pen Needle (BD ULTRA-FINE PEN NEEDLES) 29G X 12.7MM misc; Use daily with Toujeo pen  -     lisinopril-hydrochlorothiazide (PRINZIDE,ZESTORETIC) 20-12.5 MG per tablet; Take 1 tablet by mouth 2 (Two) Times a Day.  -     Lancets 30G misc; Check two time daily  E11.9  -     metFORMIN (GLUCOPHAGE) 1000 MG tablet; Take 1 tablet by mouth 2 (Two) Times a Day.  -     pravastatin (PRAVACHOL) 80 MG tablet; Take 1 tablet by mouth Daily.  -     naproxen (NAPROSYN) 500 MG tablet; Take 1 tablet by mouth 2 (Two) Times a Day With Meals.    Essential hypertension    Hyperglycemia    Chronic bilateral low back pain without sciatica    Generalized anxiety disorder    Severe episode of recurrent major depressive disorder, without psychotic features (CMS/[facility])    History of alcohol abuse    Class 1 obesity due to excess calories with  serious comorbidity and body mass index (BMI) of 31.0 to 31.9 in adult        No visits with results within 3 Week(s) from this visit.   Latest known visit with results is:   Lab on 05/14/2018   Component Date Value Ref Range Status   • Microalbumin/Creatinine Ratio 05/14/2018 10.6  0.0 - 30.0 mg/g Final   • Creatinine, Urine 05/14/2018 85.0  mg/dL Final   • Microalbumin, Urine 05/14/2018 0.9  mg/L Final   • TSH 05/14/2018 2.490  0.460 - 4.680 mIU/mL Final   • Glucose 05/14/2018 366* 70 - 100 mg/dL Final   • BUN 05/14/2018 22  8 - 25 mg/dL Final   • Creatinine 05/14/2018 1.30  0.40 - 1.30 mg/dL Final   • Sodium 05/14/2018 132* 134 - 146 mmol/L Final   • Potassium 05/14/2018 4.3  3.4 - 5.4 mmol/L Final   • Chloride 05/14/2018 92* 100 - 112 mmol/L Final   • CO2 05/14/2018 27.0  20.0 - 32.0 mmol/L Final   • Calcium 05/14/2018 9.9  8.4 - 10.8 mg/dL Final   • Total Protein 05/14/2018 7.2  6.7 - 8.2 g/dL Final   • Albumin 05/14/2018 4.10  3.20 - 5.50 g/dL Final   • ALT (SGPT) 05/14/2018 18  10 - 60 U/L Final   • AST (SGOT) 05/14/2018 19  10 - 60 U/L Final   • Alkaline Phosphatase 05/14/2018 83  15 - 121 U/L Final   • Total Bilirubin 05/14/2018 1.1* 0.2 - 1.0 mg/dL Final   • eGFR Non African Amer 05/14/2018 58  56 - 130 mL/min/1.73 Final   • Globulin 05/14/2018 3.1  2.5 - 4.6 gm/dL Final   • A/G Ratio 05/14/2018 1.3  1.0 - 3.0 g/dL Final   • BUN/Creatinine Ratio 05/14/2018 16.9  7.0 - 25.0 Final   • Anion Gap 05/14/2018 13.0  5.0 - 15.0 mmol/L Final   • Hemoglobin A1C 05/14/2018 14.3* 4 - 5.6 % Final   • Total Cholesterol 05/14/2018 197  150 - 200 mg/dL Final   • Triglycerides 05/14/2018 216* 35 - 160 mg/dL Final   • HDL Cholesterol 05/14/2018 49  35 - 100 mg/dL Final   • LDL Cholesterol  05/14/2018 105  mg/dL Final   • VLDL Cholesterol 05/14/2018 43.2  mg/dL Final   • LDL/HDL Ratio 05/14/2018 2.14   Final   • PSA 05/14/2018 0.475  0.000 - 4.000 ng/mL Final   • WBC 05/14/2018 8.13  3.20 - 9.80 10*3/mm3 Final   • RBC  05/14/2018 4.81  4.37 - 5.74 10*6/mm3 Final   • Hemoglobin 05/14/2018 13.9  13.7 - 17.3 g/dL Final   • Hematocrit 05/14/2018 39.5  39.0 - 49.0 % Final   • MCV 05/14/2018 82.1  80.0 - 98.0 fL Final   • MCH 05/14/2018 28.9  26.5 - 34.0 pg Final   • MCHC 05/14/2018 35.2  31.5 - 36.3 g/dL Final   • RDW 05/14/2018 12.2  11.5 - 14.5 % Final   • RDW-SD 05/14/2018 35.6  35.1 - 43.9 fl Final   • MPV 05/14/2018 10.0  8.0 - 12.0 fL Final   • Platelets 05/14/2018 198  150 - 450 10*3/mm3 Final   • Neutrophil % 05/14/2018 63.0  37.0 - 80.0 % Final   • Lymphocyte % 05/14/2018 27.2  10.0 - 50.0 % Final   • Monocyte % 05/14/2018 6.2  0.0 - 12.0 % Final   • Eosinophil % 05/14/2018 2.5  0.0 - 7.0 % Final   • Basophil % 05/14/2018 1.1  0.0 - 2.0 % Final   • Neutrophils, Absolute 05/14/2018 5.13  2.00 - 8.60 10*3/mm3 Final   • Lymphocytes, Absolute 05/14/2018 2.21  0.60 - 4.20 10*3/mm3 Final   • Monocytes, Absolute 05/14/2018 0.50  0.00 - 0.90 10*3/mm3 Final   • Eosinophils, Absolute 05/14/2018 0.20  0.00 - 0.70 10*3/mm3 Final   • Basophils, Absolute 05/14/2018 0.09  0.00 - 0.20 10*3/mm3 Final   ]

## 2018-08-24 NOTE — PATIENT INSTRUCTIONS
Diabetes Mellitus and Exercise  Exercising regularly is important for your overall health, especially when you have diabetes (diabetes mellitus). Exercising is not only about losing weight. It has many health benefits, such as increasing muscle strength and bone density and reducing body fat and stress. This leads to improved fitness, flexibility, and endurance, all of which result in better overall health.  Exercise has additional benefits for people with diabetes, including:  · Reducing appetite.  · Helping to lower and control blood glucose.  · Lowering blood pressure.  · Helping to control amounts of fatty substances (lipids) in the blood, such as cholesterol and triglycerides.  · Helping the body to respond better to insulin (improving insulin sensitivity).  · Reducing how much insulin the body needs.  · Decreasing the risk for heart disease by:  ? Lowering cholesterol and triglyceride levels.  ? Increasing the levels of good cholesterol.  ? Lowering blood glucose levels.    What is my activity plan?  Your health care provider or certified diabetes educator can help you make a plan for the type and frequency of exercise (activity plan) that works for you. Make sure that you:  · Do at least 150 minutes of moderate-intensity or vigorous-intensity exercise each week. This could be brisk walking, biking, or water aerobics.  ? Do stretching and strength exercises, such as yoga or weightlifting, at least 2 times a week.  ? Spread out your activity over at least 3 days of the week.  · Get some form of physical activity every day.  ? Do not go more than 2 days in a row without some kind of physical activity.  ? Avoid being inactive for more than 90 minutes at a time. Take frequent breaks to walk or stretch.  · Choose a type of exercise or activity that you enjoy, and set realistic goals.  · Start slowly, and gradually increase the intensity of your exercise over time.    What do I need to know about managing my  diabetes?  · Check your blood glucose before and after exercising.  ? If your blood glucose is higher than 240 mg/dL (13.3 mmol/L) before you exercise, check your urine for ketones. If you have ketones in your urine, do not exercise until your blood glucose returns to normal.  · Know the symptoms of low blood glucose (hypoglycemia) and how to treat it. Your risk for hypoglycemia increases during and after exercise. Common symptoms of hypoglycemia can include:  ? Hunger.  ? Anxiety.  ? Sweating and feeling clammy.  ? Confusion.  ? Dizziness or feeling light-headed.  ? Increased heart rate or palpitations.  ? Blurry vision.  ? Tingling or numbness around the mouth, lips, or tongue.  ? Tremors or shakes.  ? Irritability.  · Keep a rapid-acting carbohydrate snack available before, during, and after exercise to help prevent or treat hypoglycemia.  · Avoid injecting insulin into areas of the body that are going to be exercised. For example, avoid injecting insulin into:  ? The arms, when playing tennis.  ? The legs, when jogging.  · Keep records of your exercise habits. Doing this can help you and your health care provider adjust your diabetes management plan as needed. Write down:  ? Food that you eat before and after you exercise.  ? Blood glucose levels before and after you exercise.  ? The type and amount of exercise you have done.  ? When your insulin is expected to peak, if you use insulin. Avoid exercising at times when your insulin is peaking.  · When you start a new exercise or activity, work with your health care provider to make sure the activity is safe for you, and to adjust your insulin, medicines, or food intake as needed.  · Drink plenty of water while you exercise to prevent dehydration or heat stroke. Drink enough fluid to keep your urine clear or pale yellow.  This information is not intended to replace advice given to you by your health care provider. Make sure you discuss any questions you have with  your health care provider.  Document Released: 03/09/2005 Document Revised: 07/07/2017 Document Reviewed: 05/29/2017  Elsevier Interactive Patient Education © 2018 [employer]

## 2018-09-25 RX ORDER — GLIMEPIRIDE 4 MG/1
4 TABLET ORAL 2 TIMES DAILY
Qty: 60 TABLET | Refills: 5 | Status: SHIPPED | OUTPATIENT
Start: 2018-09-25 | End: 2019-02-26 | Stop reason: SDUPTHER

## 2018-11-30 ENCOUNTER — LAB (OUTPATIENT)
Dept: LAB | Facility: OTHER | Age: 53
End: 2018-11-30

## 2018-11-30 DIAGNOSIS — E11.42 TYPE 2 DIABETES MELLITUS WITH DIABETIC POLYNEUROPATHY, WITHOUT LONG-TERM CURRENT USE OF INSULIN (HCC): Chronic | ICD-10-CM

## 2018-11-30 DIAGNOSIS — E78.2 MIXED HYPERLIPIDEMIA: Chronic | ICD-10-CM

## 2018-11-30 LAB
ALBUMIN SERPL-MCNC: 4.4 G/DL (ref 3.5–5)
ALBUMIN/GLOB SERPL: 1.6 G/DL (ref 1.1–1.8)
ALP SERPL-CCNC: 58 U/L (ref 38–126)
ALT SERPL W P-5'-P-CCNC: 16 U/L
ANION GAP SERPL CALCULATED.3IONS-SCNC: 8 MMOL/L (ref 5–15)
AST SERPL-CCNC: 22 U/L (ref 17–59)
BASOPHILS # BLD AUTO: 0.06 10*3/MM3 (ref 0–0.2)
BASOPHILS NFR BLD AUTO: 1.1 % (ref 0–2)
BILIRUB SERPL-MCNC: 1.1 MG/DL (ref 0.2–1.3)
BUN BLD-MCNC: 13 MG/DL (ref 9–20)
BUN/CREAT SERPL: 10.7 (ref 7–25)
CALCIUM SPEC-SCNC: 9.4 MG/DL (ref 8.4–10.2)
CHLORIDE SERPL-SCNC: 103 MMOL/L (ref 98–107)
CHOLEST SERPL-MCNC: 143 MG/DL (ref 150–200)
CO2 SERPL-SCNC: 28 MMOL/L (ref 22–30)
CREAT BLD-MCNC: 1.21 MG/DL (ref 0.66–1.25)
DEPRECATED RDW RBC AUTO: 40.1 FL (ref 35.1–43.9)
EOSINOPHIL # BLD AUTO: 0.27 10*3/MM3 (ref 0–0.7)
EOSINOPHIL NFR BLD AUTO: 5 % (ref 0–7)
ERYTHROCYTE [DISTWIDTH] IN BLOOD BY AUTOMATED COUNT: 13.1 % (ref 11.5–14.5)
GFR SERPL CREATININE-BSD FRML MDRD: 62 ML/MIN/1.73 (ref 56–130)
GLOBULIN UR ELPH-MCNC: 2.7 GM/DL (ref 2.3–3.5)
GLUCOSE BLD-MCNC: 108 MG/DL (ref 74–99)
HBA1C MFR BLD: 7.5 % (ref 4–5.6)
HCT VFR BLD AUTO: 35.8 % (ref 39–49)
HDLC SERPL-MCNC: 66 MG/DL (ref 40–59)
HGB BLD-MCNC: 12.3 G/DL (ref 13.7–17.3)
LDLC SERPL CALC-MCNC: 63 MG/DL
LDLC/HDLC SERPL: 0.95 {RATIO} (ref 0–3.55)
LYMPHOCYTES # BLD AUTO: 1.74 10*3/MM3 (ref 0.6–4.2)
LYMPHOCYTES NFR BLD AUTO: 32.5 % (ref 10–50)
MCH RBC QN AUTO: 29.6 PG (ref 26.5–34)
MCHC RBC AUTO-ENTMCNC: 34.4 G/DL (ref 31.5–36.3)
MCV RBC AUTO: 86.1 FL (ref 80–98)
MONOCYTES # BLD AUTO: 0.63 10*3/MM3 (ref 0–0.9)
MONOCYTES NFR BLD AUTO: 11.8 % (ref 0–12)
NEUTROPHILS # BLD AUTO: 2.66 10*3/MM3 (ref 2–8.6)
NEUTROPHILS NFR BLD AUTO: 49.6 % (ref 37–80)
PLATELET # BLD AUTO: 171 10*3/MM3 (ref 150–450)
PMV BLD AUTO: 9.4 FL (ref 8–12)
POTASSIUM BLD-SCNC: 4.5 MMOL/L (ref 3.4–5)
PROT SERPL-MCNC: 7.1 G/DL (ref 6.3–8.2)
RBC # BLD AUTO: 4.16 10*6/MM3 (ref 4.37–5.74)
SODIUM BLD-SCNC: 139 MMOL/L (ref 137–145)
TRIGL SERPL-MCNC: 70 MG/DL
VLDLC SERPL-MCNC: 14 MG/DL
WBC NRBC COR # BLD: 5.36 10*3/MM3 (ref 3.2–9.8)

## 2018-11-30 PROCEDURE — 85025 COMPLETE CBC W/AUTO DIFF WBC: CPT | Performed by: INTERNAL MEDICINE

## 2018-11-30 PROCEDURE — 36415 COLL VENOUS BLD VENIPUNCTURE: CPT | Performed by: INTERNAL MEDICINE

## 2018-11-30 PROCEDURE — 80061 LIPID PANEL: CPT | Performed by: INTERNAL MEDICINE

## 2018-11-30 PROCEDURE — 83036 HEMOGLOBIN GLYCOSYLATED A1C: CPT | Performed by: INTERNAL MEDICINE

## 2018-11-30 PROCEDURE — 84681 ASSAY OF C-PEPTIDE: CPT | Performed by: INTERNAL MEDICINE

## 2018-11-30 PROCEDURE — 80053 COMPREHEN METABOLIC PANEL: CPT | Performed by: INTERNAL MEDICINE

## 2018-12-01 LAB — C PEPTIDE SERPL-MCNC: 1.4 NG/ML (ref 1.1–4.4)

## 2018-12-11 ENCOUNTER — OFFICE VISIT (OUTPATIENT)
Dept: FAMILY MEDICINE CLINIC | Facility: CLINIC | Age: 53
End: 2018-12-11

## 2018-12-11 VITALS
SYSTOLIC BLOOD PRESSURE: 138 MMHG | HEIGHT: 69 IN | HEART RATE: 80 BPM | WEIGHT: 222 LBS | DIASTOLIC BLOOD PRESSURE: 80 MMHG | BODY MASS INDEX: 32.88 KG/M2 | TEMPERATURE: 98.7 F

## 2018-12-11 DIAGNOSIS — E11.42 TYPE 2 DIABETES MELLITUS WITH DIABETIC POLYNEUROPATHY, WITHOUT LONG-TERM CURRENT USE OF INSULIN (HCC): Primary | Chronic | ICD-10-CM

## 2018-12-11 DIAGNOSIS — I10 ESSENTIAL HYPERTENSION: Chronic | ICD-10-CM

## 2018-12-11 DIAGNOSIS — F32.9 MAJOR DEPRESSIVE DISORDER WITHOUT PSYCHOTIC FEATURES: Chronic | ICD-10-CM

## 2018-12-11 DIAGNOSIS — F10.11 HISTORY OF ALCOHOL ABUSE: Chronic | ICD-10-CM

## 2018-12-11 DIAGNOSIS — E78.2 MIXED HYPERLIPIDEMIA: Chronic | ICD-10-CM

## 2018-12-11 DIAGNOSIS — Z12.5 SPECIAL SCREENING FOR MALIGNANT NEOPLASM OF PROSTATE: ICD-10-CM

## 2018-12-11 DIAGNOSIS — E66.09 CLASS 1 OBESITY DUE TO EXCESS CALORIES WITH SERIOUS COMORBIDITY AND BODY MASS INDEX (BMI) OF 31.0 TO 31.9 IN ADULT: Chronic | ICD-10-CM

## 2018-12-11 DIAGNOSIS — F41.1 GENERALIZED ANXIETY DISORDER: Chronic | ICD-10-CM

## 2018-12-11 PROCEDURE — 99214 OFFICE O/P EST MOD 30 MIN: CPT | Performed by: INTERNAL MEDICINE

## 2018-12-11 NOTE — PATIENT INSTRUCTIONS
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
## 2019-01-04 DIAGNOSIS — E11.42 TYPE 2 DIABETES MELLITUS WITH DIABETIC POLYNEUROPATHY, WITHOUT LONG-TERM CURRENT USE OF INSULIN (HCC): Chronic | ICD-10-CM

## 2019-01-04 RX ORDER — NAPROXEN 500 MG/1
500 TABLET ORAL 2 TIMES DAILY WITH MEALS
Qty: 60 TABLET | Refills: 3 | Status: SHIPPED | OUTPATIENT
Start: 2019-01-04 | End: 2019-02-26 | Stop reason: SDUPTHER

## 2019-02-26 DIAGNOSIS — E11.42 TYPE 2 DIABETES MELLITUS WITH DIABETIC POLYNEUROPATHY, WITHOUT LONG-TERM CURRENT USE OF INSULIN (HCC): Chronic | ICD-10-CM

## 2019-02-26 RX ORDER — NAPROXEN 500 MG/1
500 TABLET ORAL 2 TIMES DAILY WITH MEALS
Qty: 60 TABLET | Refills: 3 | Status: SHIPPED | OUTPATIENT
Start: 2019-02-26 | End: 2019-07-29 | Stop reason: SDUPTHER

## 2019-02-26 RX ORDER — ESCITALOPRAM OXALATE 20 MG/1
20 TABLET ORAL DAILY
Qty: 90 TABLET | Refills: 3 | Status: SHIPPED | OUTPATIENT
Start: 2019-02-26 | End: 2019-09-13 | Stop reason: SDUPTHER

## 2019-02-26 RX ORDER — LISINOPRIL AND HYDROCHLOROTHIAZIDE 20; 12.5 MG/1; MG/1
1 TABLET ORAL 2 TIMES DAILY
Qty: 180 TABLET | Refills: 3 | Status: SHIPPED | OUTPATIENT
Start: 2019-02-26 | End: 2020-01-07

## 2019-02-26 RX ORDER — PRAVASTATIN SODIUM 80 MG/1
80 TABLET ORAL DAILY
Qty: 90 TABLET | Refills: 3 | Status: SHIPPED | OUTPATIENT
Start: 2019-02-26 | End: 2019-04-19 | Stop reason: SDUPTHER

## 2019-02-26 RX ORDER — GLIMEPIRIDE 4 MG/1
4 TABLET ORAL 2 TIMES DAILY
Qty: 180 TABLET | Refills: 3 | Status: SHIPPED | OUTPATIENT
Start: 2019-02-26 | End: 2020-03-11

## 2019-02-26 RX ORDER — SILDENAFIL CITRATE 20 MG/1
TABLET ORAL
Qty: 36 TABLET | Refills: 3 | Status: SHIPPED | OUTPATIENT
Start: 2019-02-26

## 2019-03-22 DIAGNOSIS — E11.42 TYPE 2 DIABETES MELLITUS WITH DIABETIC POLYNEUROPATHY, WITHOUT LONG-TERM CURRENT USE OF INSULIN (HCC): Chronic | ICD-10-CM

## 2019-03-25 RX ORDER — PRAVASTATIN SODIUM 80 MG/1
80 TABLET ORAL DAILY
Qty: 30 TABLET | Refills: 5 | OUTPATIENT
Start: 2019-03-25

## 2019-04-19 DIAGNOSIS — E11.42 TYPE 2 DIABETES MELLITUS WITH DIABETIC POLYNEUROPATHY, WITHOUT LONG-TERM CURRENT USE OF INSULIN (HCC): Chronic | ICD-10-CM

## 2019-04-19 RX ORDER — PRAVASTATIN SODIUM 80 MG/1
80 TABLET ORAL DAILY
Qty: 30 TABLET | Refills: 5 | Status: SHIPPED | OUTPATIENT
Start: 2019-04-19 | End: 2019-10-11 | Stop reason: SDUPTHER

## 2019-06-20 ENCOUNTER — LAB (OUTPATIENT)
Dept: LAB | Facility: OTHER | Age: 54
End: 2019-06-20

## 2019-06-20 DIAGNOSIS — Z12.5 SPECIAL SCREENING FOR MALIGNANT NEOPLASM OF PROSTATE: ICD-10-CM

## 2019-06-20 DIAGNOSIS — E78.2 MIXED HYPERLIPIDEMIA: Chronic | ICD-10-CM

## 2019-06-20 DIAGNOSIS — E11.42 TYPE 2 DIABETES MELLITUS WITH DIABETIC POLYNEUROPATHY, WITHOUT LONG-TERM CURRENT USE OF INSULIN (HCC): Chronic | ICD-10-CM

## 2019-06-20 LAB
ALBUMIN SERPL-MCNC: 4.2 G/DL (ref 3.5–5)
ALBUMIN/GLOB SERPL: 1.4 G/DL (ref 1.1–1.8)
ALP SERPL-CCNC: 77 U/L (ref 38–126)
ALT SERPL W P-5'-P-CCNC: 16 U/L
ANION GAP SERPL CALCULATED.3IONS-SCNC: 12 MMOL/L (ref 5–15)
AST SERPL-CCNC: 20 U/L (ref 17–59)
BASOPHILS # BLD AUTO: 0.07 10*3/MM3 (ref 0–0.2)
BASOPHILS NFR BLD AUTO: 1 % (ref 0–1.5)
BILIRUB SERPL-MCNC: 1.1 MG/DL (ref 0.2–1.3)
BUN BLD-MCNC: 18 MG/DL (ref 7–23)
BUN/CREAT SERPL: 14.5 (ref 7–25)
CALCIUM SPEC-SCNC: 9.5 MG/DL (ref 8.4–10.2)
CHLORIDE SERPL-SCNC: 101 MMOL/L (ref 101–112)
CHOLEST SERPL-MCNC: 131 MG/DL (ref 150–200)
CO2 SERPL-SCNC: 29 MMOL/L (ref 22–30)
CREAT BLD-MCNC: 1.24 MG/DL (ref 0.7–1.3)
DEPRECATED RDW RBC AUTO: 38.8 FL (ref 37–54)
EOSINOPHIL # BLD AUTO: 0.2 10*3/MM3 (ref 0–0.4)
EOSINOPHIL NFR BLD AUTO: 2.9 % (ref 0.3–6.2)
ERYTHROCYTE [DISTWIDTH] IN BLOOD BY AUTOMATED COUNT: 12.7 % (ref 12.3–15.4)
GFR SERPL CREATININE-BSD FRML MDRD: 61 ML/MIN/1.73 (ref 56–130)
GLOBULIN UR ELPH-MCNC: 2.9 GM/DL (ref 2.3–3.5)
GLUCOSE BLD-MCNC: 219 MG/DL (ref 70–99)
HCT VFR BLD AUTO: 35.6 % (ref 37.5–51)
HDLC SERPL-MCNC: 50 MG/DL (ref 40–59)
HGB BLD-MCNC: 12.9 G/DL (ref 13–17.7)
LDLC SERPL CALC-MCNC: 62 MG/DL
LDLC/HDLC SERPL: 1.23 {RATIO} (ref 0–3.55)
LYMPHOCYTES # BLD AUTO: 1.81 10*3/MM3 (ref 0.7–3.1)
LYMPHOCYTES NFR BLD AUTO: 26.4 % (ref 19.6–45.3)
MCH RBC QN AUTO: 31.2 PG (ref 26.6–33)
MCHC RBC AUTO-ENTMCNC: 36.2 G/DL (ref 31.5–35.7)
MCV RBC AUTO: 86.2 FL (ref 79–97)
MONOCYTES # BLD AUTO: 0.57 10*3/MM3 (ref 0.1–0.9)
MONOCYTES NFR BLD AUTO: 8.3 % (ref 5–12)
NEUTROPHILS # BLD AUTO: 4.21 10*3/MM3 (ref 1.7–7)
NEUTROPHILS NFR BLD AUTO: 61.4 % (ref 42.7–76)
PLATELET # BLD AUTO: 181 10*3/MM3 (ref 140–450)
PMV BLD AUTO: 9.6 FL (ref 6–12)
POTASSIUM BLD-SCNC: 4.5 MMOL/L (ref 3.4–5)
PROT SERPL-MCNC: 7.1 G/DL (ref 6.3–8.6)
RBC # BLD AUTO: 4.13 10*6/MM3 (ref 4.14–5.8)
SODIUM BLD-SCNC: 142 MMOL/L (ref 137–145)
TRIGL SERPL-MCNC: 97 MG/DL
VLDLC SERPL-MCNC: 19.4 MG/DL
WBC NRBC COR # BLD: 6.86 10*3/MM3 (ref 3.4–10.8)

## 2019-06-20 PROCEDURE — 82570 ASSAY OF URINE CREATININE: CPT | Performed by: INTERNAL MEDICINE

## 2019-06-20 PROCEDURE — 80053 COMPREHEN METABOLIC PANEL: CPT | Performed by: INTERNAL MEDICINE

## 2019-06-20 PROCEDURE — 82043 UR ALBUMIN QUANTITATIVE: CPT | Performed by: INTERNAL MEDICINE

## 2019-06-20 PROCEDURE — 84443 ASSAY THYROID STIM HORMONE: CPT | Performed by: INTERNAL MEDICINE

## 2019-06-20 PROCEDURE — 80061 LIPID PANEL: CPT | Performed by: INTERNAL MEDICINE

## 2019-06-20 PROCEDURE — 83036 HEMOGLOBIN GLYCOSYLATED A1C: CPT | Performed by: INTERNAL MEDICINE

## 2019-06-20 PROCEDURE — G0103 PSA SCREENING: HCPCS | Performed by: INTERNAL MEDICINE

## 2019-06-20 PROCEDURE — 85025 COMPLETE CBC W/AUTO DIFF WBC: CPT | Performed by: INTERNAL MEDICINE

## 2019-06-20 PROCEDURE — 36415 COLL VENOUS BLD VENIPUNCTURE: CPT | Performed by: INTERNAL MEDICINE

## 2019-06-21 LAB
ALBUMIN UR-MCNC: 71.4 MG/L
CREAT UR-MCNC: 156.5 MG/DL
HBA1C MFR BLD: 7.76 % (ref 4.8–5.6)
MICROALBUMIN/CREAT UR: 456.2 MG/G
PSA SERPL-MCNC: 0.64 NG/ML (ref 0–4)
TSH SERPL DL<=0.05 MIU/L-ACNC: 2.12 MIU/ML (ref 0.27–4.2)

## 2019-07-01 ENCOUNTER — OFFICE VISIT (OUTPATIENT)
Dept: FAMILY MEDICINE CLINIC | Facility: CLINIC | Age: 54
End: 2019-07-01

## 2019-07-01 VITALS
HEIGHT: 69 IN | SYSTOLIC BLOOD PRESSURE: 154 MMHG | DIASTOLIC BLOOD PRESSURE: 98 MMHG | WEIGHT: 221 LBS | BODY MASS INDEX: 32.73 KG/M2 | TEMPERATURE: 98.3 F | HEART RATE: 88 BPM

## 2019-07-01 DIAGNOSIS — F10.11 HISTORY OF ALCOHOL ABUSE: Chronic | ICD-10-CM

## 2019-07-01 DIAGNOSIS — E78.2 MIXED HYPERLIPIDEMIA: Chronic | ICD-10-CM

## 2019-07-01 DIAGNOSIS — N52.9 VASCULOGENIC ERECTILE DYSFUNCTION, UNSPECIFIED VASCULOGENIC ERECTILE DYSFUNCTION TYPE: Chronic | ICD-10-CM

## 2019-07-01 DIAGNOSIS — E11.42 TYPE 2 DIABETES MELLITUS WITH DIABETIC POLYNEUROPATHY, WITHOUT LONG-TERM CURRENT USE OF INSULIN (HCC): Primary | Chronic | ICD-10-CM

## 2019-07-01 DIAGNOSIS — E66.09 CLASS 1 OBESITY DUE TO EXCESS CALORIES WITH SERIOUS COMORBIDITY AND BODY MASS INDEX (BMI) OF 31.0 TO 31.9 IN ADULT: Chronic | ICD-10-CM

## 2019-07-01 DIAGNOSIS — I10 ESSENTIAL HYPERTENSION: Chronic | ICD-10-CM

## 2019-07-01 DIAGNOSIS — F32.9 MAJOR DEPRESSIVE DISORDER WITHOUT PSYCHOTIC FEATURES: Chronic | ICD-10-CM

## 2019-07-01 DIAGNOSIS — F41.1 GENERALIZED ANXIETY DISORDER: Chronic | ICD-10-CM

## 2019-07-01 PROCEDURE — 99214 OFFICE O/P EST MOD 30 MIN: CPT | Performed by: INTERNAL MEDICINE

## 2019-07-01 NOTE — PROGRESS NOTES
Subjective        History of Present Illness     Wesly Menezes is a 53 y.o. male who comes in for 6-month follow up on medical issues include type 2 diabetes, hyperlipidemia, hypertension, depression/OFELIA, and other medical issues.   He has a history of alcohol abuse, but reports he has been abstaining from alcohol completely.  Denies tobacco use.      He established care in May 2018 with historically poorly controlled diabetes.  Hemoglobin A1c was 14.3 with labs at his initial visit in May.  I added low dose Toujeo insulin to his Amaryl and metformin with a vast improvement in diabetes management with A1c down from 14.3  to 7.5 six months ago with only a slight increase to 7.7 with current labs.  He describes episodic symptoms of hypoglycemia improved by eating a small snack.     With his visit six months ago, his father had recently passed away and he had stopped taking his Lexapro daily due to excessive sedation with a whole tablet.  I recommended he try taking a half tablet to see if this is less sedating.  He continues skipping doses of the Lxapro taking a dose on days he feels more anxious.  I once again encouraged daily use of the Lexapro.  He attended [employer] in the past, although, he has not been attending [employer] since his father passed away.  I encouraged him to get back in [employer] where he can have support of friends.     Weight is up 11 pounds, although, he has steel toe boots on.  Blood pressure above goal today at 154/98.  He has not taken his blood pressure medication today and reports he sometimes misses his morning medications on days he is not working and sleeping in.  He reports systolic blood pressure is normally around 130 at home.     .  The patient's relevant past medical, surgical, and social history was reviewed in Epic.   Lab results are reviewed with the patient today.  CBC unremarkable.  Glucose was 219 with  A1c 7.7 increased from 7.5 six months ago, but vastly improved from  "14.3 a year ago.  He reports he had eaten a muffin a few hours prior.  Total cholesterol 131. HDL 50.  LDL 62.  Triglycerides 97 with LDL/HDL ratio 1.23.    Normal PSA.  Normal renal and liver function.     Review of Systems   Constitutional: Negative for chills, fatigue and fever.   HENT: Negative for congestion, ear pain, postnasal drip, sinus pressure and sore throat.    Respiratory: Negative for cough, shortness of breath and wheezing.    Cardiovascular: Negative for chest pain, palpitations and leg swelling.   Gastrointestinal: Negative for abdominal pain, blood in stool, constipation, diarrhea, nausea and vomiting.   Endocrine: Negative for cold intolerance, heat intolerance, polydipsia and polyuria.   Genitourinary: Negative for dysuria, frequency, hematuria and urgency.   Skin: Negative for rash.   Neurological: Negative for syncope and weakness.        Objective      Vitals:    07/01/19 1527   BP: 154/98   Pulse: 88   Temp: 98.3 °F (36.8 °C)   TempSrc: Oral   Weight: 100 kg (221 lb)   Height: 175.3 cm (69\")       Physical Exam   Constitutional: He is oriented to person, place, and time. He appears well-developed and well-nourished. No distress.   Obese female.    HENT:   Head: Normocephalic and atraumatic.   Nose: Right sinus exhibits no maxillary sinus tenderness and no frontal sinus tenderness. Left sinus exhibits no maxillary sinus tenderness and no frontal sinus tenderness.   Mouth/Throat: Uvula is midline, oropharynx is clear and moist and mucous membranes are normal. No oral lesions. No tonsillar exudate.   Eyes: Conjunctivae and EOM are normal. Pupils are equal, round, and reactive to light.   Neck: Trachea normal. Neck supple. No JVD present. Carotid bruit is not present. No tracheal deviation present. No thyroid mass and no thyromegaly present.   Cardiovascular: Normal rate, regular rhythm, normal heart sounds and intact distal pulses.  No extrasystoles are present. PMI is not displaced.   No " murmur heard.  Pulmonary/Chest: Effort normal and breath sounds normal. No accessory muscle usage. No respiratory distress. He has no decreased breath sounds. He has no wheezes. He has no rhonchi. He has no rales.   Abdominal: Soft. Bowel sounds are normal. He exhibits no distension. There is no hepatosplenomegaly. There is no tenderness.   Obese abdomen limits exam.      Vascular Status -  His right foot exhibits abnormal foot edema (Trace edema bilateral lower extremities. ). His right foot exhibits normal foot vasculature . His left foot exhibits abnormal foot edema. His left foot exhibits normal foot vasculature .  Lymphadenopathy:     He has no cervical adenopathy.   Neurological: He is alert and oriented to person, place, and time. No cranial nerve deficit. Coordination normal.   Skin: Skin is warm, dry and intact. No rash noted. No cyanosis. Nails show no clubbing.   Psychiatric: He has a normal mood and affect. His speech is normal and behavior is normal. Judgment and thought content normal.   Vitals reviewed.        Assessment/Plan      Continue pravastatin and dietary efforts.     Continue Amaryl and current dose of insulin.  Pursue diabetic diet, exercise and weight loss efforts to improve diabetic management.       Continue lisinopril HCT.  Discussed importance of medication compliance in managing blood pressure as well as other chronic issues.  Continue to monitor blood pressure and notify me if consistently above goal when taking medications as prescribed.      He is commended on alcohol cessation and encouraged not to start drinking alcohol again.      I recommended he take the Lexapro daily even he takes 1/2 of the 20 mg tablet daily.  I continue to encourage him to get involved in [employer].    Return in six months for follow up with fasting labs one week prior.      Scribed for Dr. Cadena by Yoko Johnson [facility].     Diagnoses and all orders for this visit:    Type 2 diabetes mellitus with diabetic  polyneuropathy, without long-term current use of insulin (CMS/[facility])  -     CBC Auto Differential; Future  -     Comprehensive Metabolic Panel; Future  -     Hemoglobin A1c; Future  -     Lipid Panel; Future    Essential hypertension  -     CBC Auto Differential; Future  -     Comprehensive Metabolic Panel; Future    Mixed hyperlipidemia  -     Lipid Panel; Future    Class 1 obesity due to excess calories with serious comorbidity and body mass index (BMI) of 31.0 to 31.9 in adult    Generalized anxiety disorder    Major depressive disorder without psychotic features    History of alcohol abuse    Vasculogenic erectile dysfunction, unspecified vasculogenic erectile dysfunction type        Lab on 06/20/2019   Component Date Value Ref Range Status   • WBC 06/20/2019 6.86  3.40 - 10.80 10*3/mm3 Final   • RBC 06/20/2019 4.13* 4.14 - 5.80 10*6/mm3 Final   • Hemoglobin 06/20/2019 12.9* 13.0 - 17.7 g/dL Final   • Hematocrit 06/20/2019 35.6* 37.5 - 51.0 % Final   • MCV 06/20/2019 86.2  79.0 - 97.0 fL Final   • MCH 06/20/2019 31.2  26.6 - 33.0 pg Final   • MCHC 06/20/2019 36.2* 31.5 - 35.7 g/dL Final   • RDW 06/20/2019 12.7  12.3 - 15.4 % Final   • RDW-SD 06/20/2019 38.8  37.0 - 54.0 fl Final   • MPV 06/20/2019 9.6  6.0 - 12.0 fL Final   • Platelets 06/20/2019 181  140 - 450 10*3/mm3 Final   • Neutrophil % 06/20/2019 61.4  42.7 - 76.0 % Final   • Lymphocyte % 06/20/2019 26.4  19.6 - 45.3 % Final   • Monocyte % 06/20/2019 8.3  5.0 - 12.0 % Final   • Eosinophil % 06/20/2019 2.9  0.3 - 6.2 % Final   • Basophil % 06/20/2019 1.0  0.0 - 1.5 % Final   • Neutrophils, Absolute 06/20/2019 4.21  1.70 - 7.00 10*3/mm3 Final   • Lymphocytes, Absolute 06/20/2019 1.81  0.70 - 3.10 10*3/mm3 Final   • Monocytes, Absolute 06/20/2019 0.57  0.10 - 0.90 10*3/mm3 Final   • Eosinophils, Absolute 06/20/2019 0.20  0.00 - 0.40 10*3/mm3 Final   • Basophils, Absolute 06/20/2019 0.07  0.00 - 0.20 10*3/mm3 Final   • Glucose 06/20/2019 219* 70 - 99 mg/dL  Final   • BUN 06/20/2019 18  7 - 23 mg/dL Final   • Creatinine 06/20/2019 1.24  0.70 - 1.30 mg/dL Final   • Sodium 06/20/2019 142  137 - 145 mmol/L Final   • Potassium 06/20/2019 4.5  3.4 - 5.0 mmol/L Final   • Chloride 06/20/2019 101  101 - 112 mmol/L Final   • CO2 06/20/2019 29.0  22.0 - 30.0 mmol/L Final   • Calcium 06/20/2019 9.5  8.4 - 10.2 mg/dL Final   • Total Protein 06/20/2019 7.1  6.3 - 8.6 g/dL Final   • Albumin 06/20/2019 4.20  3.50 - 5.00 g/dL Final   • ALT (SGPT) 06/20/2019 16  <=50 U/L Final   • AST (SGOT) 06/20/2019 20  17 - 59 U/L Final   • Alkaline Phosphatase 06/20/2019 77  38 - 126 U/L Final   • Total Bilirubin 06/20/2019 1.1  0.2 - 1.3 mg/dL Final   • eGFR Non  Amer 06/20/2019 61  56 - 130 mL/min/1.73 Final   • Globulin 06/20/2019 2.9  2.3 - 3.5 gm/dL Final   • A/G Ratio 06/20/2019 1.4  1.1 - 1.8 g/dL Final   • BUN/Creatinine Ratio 06/20/2019 14.5  7.0 - 25.0 Final   • Anion Gap 06/20/2019 12.0  5.0 - 15.0 mmol/L Final   • Hemoglobin A1C 06/20/2019 7.76* 4.80 - 5.60 % Final   • Total Cholesterol 06/20/2019 131* 150 - 200 mg/dL Final   • Triglycerides 06/20/2019 97  <=150 mg/dL Final   • HDL Cholesterol 06/20/2019 50  40 - 59 mg/dL Final   • LDL Cholesterol  06/20/2019 62  <=100 mg/dL Final   • VLDL Cholesterol 06/20/2019 19.4  mg/dL Final   • LDL/HDL Ratio 06/20/2019 1.23  0.00 - 3.55 Final   • Microalbumin/Creatinine Ratio 06/20/2019 456.2  mg/g Final   • Creatinine, Urine 06/20/2019 156.5  mg/dL Final   • Microalbumin, Urine 06/20/2019 71.4  mg/L Final   • TSH 06/20/2019 2.120  0.270 - 4.200 mIU/mL Final   • PSA 06/20/2019 0.644  0.000 - 4.000 ng/mL Final   ]

## 2019-07-29 DIAGNOSIS — E11.42 TYPE 2 DIABETES MELLITUS WITH DIABETIC POLYNEUROPATHY, WITHOUT LONG-TERM CURRENT USE OF INSULIN (HCC): Chronic | ICD-10-CM

## 2019-07-29 RX ORDER — NAPROXEN 500 MG/1
500 TABLET ORAL 2 TIMES DAILY WITH MEALS
Qty: 60 TABLET | Refills: 2 | Status: SHIPPED | OUTPATIENT
Start: 2019-07-29 | End: 2019-10-07 | Stop reason: SDUPTHER

## 2019-09-13 RX ORDER — ESCITALOPRAM OXALATE 20 MG/1
20 TABLET ORAL DAILY
Qty: 30 TABLET | Refills: 11 | Status: SHIPPED | OUTPATIENT
Start: 2019-09-13 | End: 2020-03-17

## 2019-10-07 DIAGNOSIS — E11.42 TYPE 2 DIABETES MELLITUS WITH DIABETIC POLYNEUROPATHY, WITHOUT LONG-TERM CURRENT USE OF INSULIN (HCC): Chronic | ICD-10-CM

## 2019-10-07 RX ORDER — NAPROXEN 500 MG/1
500 TABLET ORAL 2 TIMES DAILY WITH MEALS
Qty: 180 TABLET | Refills: 1 | Status: SHIPPED | OUTPATIENT
Start: 2019-10-07 | End: 2020-01-09

## 2019-10-11 DIAGNOSIS — E11.42 TYPE 2 DIABETES MELLITUS WITH DIABETIC POLYNEUROPATHY, WITHOUT LONG-TERM CURRENT USE OF INSULIN (HCC): Chronic | ICD-10-CM

## 2019-10-11 RX ORDER — PRAVASTATIN SODIUM 80 MG/1
80 TABLET ORAL DAILY
Qty: 30 TABLET | Refills: 5 | Status: SHIPPED | OUTPATIENT
Start: 2019-10-11 | End: 2020-01-07

## 2020-01-07 DIAGNOSIS — E11.42 TYPE 2 DIABETES MELLITUS WITH DIABETIC POLYNEUROPATHY, WITHOUT LONG-TERM CURRENT USE OF INSULIN (HCC): Chronic | ICD-10-CM

## 2020-01-07 RX ORDER — PRAVASTATIN SODIUM 80 MG/1
80 TABLET ORAL DAILY
Qty: 30 TABLET | Refills: 5 | Status: SHIPPED | OUTPATIENT
Start: 2020-01-07 | End: 2020-09-25

## 2020-01-07 RX ORDER — LISINOPRIL AND HYDROCHLOROTHIAZIDE 20; 12.5 MG/1; MG/1
1 TABLET ORAL 2 TIMES DAILY
Qty: 180 TABLET | Refills: 3 | Status: SHIPPED | OUTPATIENT
Start: 2020-01-07 | End: 2020-04-28

## 2020-01-08 DIAGNOSIS — E11.42 TYPE 2 DIABETES MELLITUS WITH DIABETIC POLYNEUROPATHY, WITHOUT LONG-TERM CURRENT USE OF INSULIN (HCC): Chronic | ICD-10-CM

## 2020-01-09 RX ORDER — NAPROXEN 500 MG/1
500 TABLET ORAL 2 TIMES DAILY WITH MEALS
Qty: 180 TABLET | Refills: 1 | Status: SHIPPED | OUTPATIENT
Start: 2020-01-09 | End: 2020-04-28

## 2020-01-10 ENCOUNTER — LAB (OUTPATIENT)
Dept: LAB | Facility: OTHER | Age: 55
End: 2020-01-10

## 2020-01-10 DIAGNOSIS — E78.2 MIXED HYPERLIPIDEMIA: Chronic | ICD-10-CM

## 2020-01-10 DIAGNOSIS — I10 ESSENTIAL HYPERTENSION: ICD-10-CM

## 2020-01-10 DIAGNOSIS — E11.42 TYPE 2 DIABETES MELLITUS WITH DIABETIC POLYNEUROPATHY, WITHOUT LONG-TERM CURRENT USE OF INSULIN (HCC): ICD-10-CM

## 2020-01-10 LAB
ALBUMIN SERPL-MCNC: 3.8 G/DL (ref 3.5–5)
ALBUMIN/GLOB SERPL: 1.2 G/DL (ref 1.1–1.8)
ALP SERPL-CCNC: 131 U/L (ref 38–126)
ALT SERPL W P-5'-P-CCNC: 48 U/L
ANION GAP SERPL CALCULATED.3IONS-SCNC: 8 MMOL/L (ref 5–15)
AST SERPL-CCNC: 61 U/L (ref 17–59)
BASOPHILS # BLD MANUAL: 0.05 10*3/MM3 (ref 0–0.2)
BASOPHILS NFR BLD AUTO: 1 % (ref 0–1.5)
BILIRUB SERPL-MCNC: 1 MG/DL (ref 0.2–1.3)
BUN BLD-MCNC: 18 MG/DL (ref 7–23)
BUN/CREAT SERPL: 15.1 (ref 7–25)
CALCIUM SPEC-SCNC: 8.7 MG/DL (ref 8.4–10.2)
CHLORIDE SERPL-SCNC: 95 MMOL/L (ref 101–112)
CHOLEST SERPL-MCNC: 129 MG/DL (ref 150–200)
CO2 SERPL-SCNC: 31 MMOL/L (ref 22–30)
CREAT BLD-MCNC: 1.19 MG/DL (ref 0.7–1.3)
DEPRECATED RDW RBC AUTO: 35.8 FL (ref 37–54)
EOSINOPHIL # BLD MANUAL: 0.27 10*3/MM3 (ref 0–0.4)
EOSINOPHIL NFR BLD MANUAL: 6 % (ref 0.3–6.2)
ERYTHROCYTE [DISTWIDTH] IN BLOOD BY AUTOMATED COUNT: 12.7 % (ref 12.3–15.4)
GFR SERPL CREATININE-BSD FRML MDRD: 63 ML/MIN/1.73 (ref 56–130)
GIANT PLATELETS: ABNORMAL
GLOBULIN UR ELPH-MCNC: 3.1 GM/DL (ref 2.3–3.5)
GLUCOSE BLD-MCNC: 229 MG/DL (ref 70–99)
HBA1C MFR BLD: 7.71 % (ref 4.8–5.6)
HCT VFR BLD AUTO: 34 % (ref 37.5–51)
HDLC SERPL-MCNC: 34 MG/DL (ref 40–59)
HGB BLD-MCNC: 12.4 G/DL (ref 13–17.7)
LDLC SERPL CALC-MCNC: 61 MG/DL
LDLC/HDLC SERPL: 1.81 {RATIO} (ref 0–3.55)
LYMPHOCYTES # BLD MANUAL: 2.05 10*3/MM3 (ref 0.7–3.1)
LYMPHOCYTES NFR BLD MANUAL: 12 % (ref 5–12)
LYMPHOCYTES NFR BLD MANUAL: 45 % (ref 19.6–45.3)
MCH RBC QN AUTO: 29.2 PG (ref 26.6–33)
MCHC RBC AUTO-ENTMCNC: 36.5 G/DL (ref 31.5–35.7)
MCV RBC AUTO: 80 FL (ref 79–97)
MICROCYTES BLD QL: ABNORMAL
MONOCYTES # BLD AUTO: 0.55 10*3/MM3 (ref 0.1–0.9)
NEUTROPHILS # BLD AUTO: 1.64 10*3/MM3 (ref 1.7–7)
NEUTROPHILS NFR BLD MANUAL: 36 % (ref 42.7–76)
PLATELET # BLD AUTO: 96 10*3/MM3 (ref 140–450)
PMV BLD AUTO: 10.8 FL (ref 6–12)
POTASSIUM BLD-SCNC: 4.4 MMOL/L (ref 3.4–5)
PROT SERPL-MCNC: 6.9 G/DL (ref 6.3–8.6)
RBC # BLD AUTO: 4.25 10*6/MM3 (ref 4.14–5.8)
SMALL PLATELETS BLD QL SMEAR: ABNORMAL
SODIUM BLD-SCNC: 134 MMOL/L (ref 137–145)
TRIGL SERPL-MCNC: 168 MG/DL
VLDLC SERPL-MCNC: 33.6 MG/DL
WBC MORPH BLD: NORMAL
WBC NRBC COR # BLD: 4.56 10*3/MM3 (ref 3.4–10.8)

## 2020-01-10 PROCEDURE — 36415 COLL VENOUS BLD VENIPUNCTURE: CPT | Performed by: INTERNAL MEDICINE

## 2020-01-10 PROCEDURE — 83036 HEMOGLOBIN GLYCOSYLATED A1C: CPT | Performed by: INTERNAL MEDICINE

## 2020-01-10 PROCEDURE — 80061 LIPID PANEL: CPT | Performed by: INTERNAL MEDICINE

## 2020-01-10 PROCEDURE — 80053 COMPREHEN METABOLIC PANEL: CPT | Performed by: INTERNAL MEDICINE

## 2020-01-10 PROCEDURE — 85025 COMPLETE CBC W/AUTO DIFF WBC: CPT | Performed by: INTERNAL MEDICINE

## 2020-01-28 ENCOUNTER — RESULTS ENCOUNTER (OUTPATIENT)
Dept: FAMILY MEDICINE CLINIC | Facility: CLINIC | Age: 55
End: 2020-01-28

## 2020-01-28 ENCOUNTER — OFFICE VISIT (OUTPATIENT)
Dept: FAMILY MEDICINE CLINIC | Facility: CLINIC | Age: 55
End: 2020-01-28

## 2020-01-28 VITALS
DIASTOLIC BLOOD PRESSURE: 94 MMHG | HEIGHT: 69 IN | SYSTOLIC BLOOD PRESSURE: 154 MMHG | WEIGHT: 226 LBS | BODY MASS INDEX: 33.47 KG/M2 | OXYGEN SATURATION: 99 % | TEMPERATURE: 98.3 F | HEART RATE: 92 BPM

## 2020-01-28 DIAGNOSIS — Z12.11 SCREEN FOR COLON CANCER: ICD-10-CM

## 2020-01-28 DIAGNOSIS — F41.1 GENERALIZED ANXIETY DISORDER: Chronic | ICD-10-CM

## 2020-01-28 DIAGNOSIS — E78.2 MIXED HYPERLIPIDEMIA: Chronic | ICD-10-CM

## 2020-01-28 DIAGNOSIS — E66.09 CLASS 1 OBESITY DUE TO EXCESS CALORIES WITH SERIOUS COMORBIDITY AND BODY MASS INDEX (BMI) OF 31.0 TO 31.9 IN ADULT: Chronic | ICD-10-CM

## 2020-01-28 DIAGNOSIS — E11.42 TYPE 2 DIABETES MELLITUS WITH DIABETIC POLYNEUROPATHY, WITHOUT LONG-TERM CURRENT USE OF INSULIN (HCC): Primary | Chronic | ICD-10-CM

## 2020-01-28 DIAGNOSIS — F32.9 MAJOR DEPRESSIVE DISORDER WITHOUT PSYCHOTIC FEATURES: Chronic | ICD-10-CM

## 2020-01-28 DIAGNOSIS — Z12.5 SPECIAL SCREENING FOR MALIGNANT NEOPLASM OF PROSTATE: ICD-10-CM

## 2020-01-28 DIAGNOSIS — I10 ESSENTIAL HYPERTENSION: Chronic | ICD-10-CM

## 2020-01-28 DIAGNOSIS — Z12.11 SCREEN FOR COLON CANCER: Primary | ICD-10-CM

## 2020-01-28 DIAGNOSIS — F10.11 HISTORY OF ALCOHOL ABUSE: Chronic | ICD-10-CM

## 2020-01-28 PROCEDURE — 99214 OFFICE O/P EST MOD 30 MIN: CPT | Performed by: INTERNAL MEDICINE

## 2020-01-28 RX ORDER — METOPROLOL SUCCINATE 25 MG/1
25 TABLET, EXTENDED RELEASE ORAL DAILY
Qty: 90 TABLET | Refills: 3 | Status: SHIPPED | OUTPATIENT
Start: 2020-01-28 | End: 2021-01-18

## 2020-01-28 NOTE — PROGRESS NOTES
Subjective        History of Present Illness     Wesyl Menezes is a 54 y.o. male who comes in for 6-month follow up on medical issues include type 2 diabetes, hyperlipidemia, hypertension, depression/OFELIA, and other medical issues.   He has a history of alcohol abuse.  He reports drinking approximately 1/5 of whiskey or bourbon weekly.  Denies tobacco use.      Diabetes is improved from his initial visit in May 2018 when A1c was 14.3, although, remains above goal with  A1c 7.7 on his current regimen of 10 units of insulin daily in combination with metformin and Amaryl. .  Weight is up 5 pounds over the past six months.  He reports he has been trying to eat more healthy since the holidays and feels he can lose the weight.   He is getting the Toujeo at zero co-pay with the discount card.  He is up to date on diabetic eye exam.  His most recent diabetic eye exam was by Dr. Arango 11/26/2019 with no evidence of diabetic retinopathy.      He reports anxiety, grief, and depression symptoms have been adequately controlled with the Lexapro 20 mg started in 2018.      He had an episode of coughing with wheezing this morning when he was shoveling in the cold air.  We discussed benefits of improving conditioning.      Blood pressure above goal at 154/94 and heart rate also above goal at 92.  He has not taken his second dose of lisinopril HCT this afternoon.  He checks BP occasionally, which tends to run a little high outside the clinic as well.  I am adding metoprolol to help lower blood pressure and heart rate.     He declines colonoscopy despite being aware of the risks involved with undiagnosed colon cancer.  Since patient is adamant in refusing colonoscopy, I encouraged him to consider Cologuard testing.  He is in agreement.  I did recommend he check with his insurance provider to make sure this is a covered preventative test.     He declines influenza vaccine.      The patient's relevant past medical, surgical, and  "social history was reviewed in Epic.  Lab results are reviewed with the patient today. CBC unremarkable.  Fasting glucose 229. A1c is 7.71. PSA bharat limits.  Total cholesterol 129.  HDL 34.  LDL 61.  Triglycerides 168.  LDL/HDL 1.81.      Review of Systems   Constitutional: Negative for chills, fatigue and fever.   HENT: Negative for congestion, ear pain, postnasal drip, sinus pressure and sore throat.    Respiratory: Negative for cough, shortness of breath and wheezing.    Cardiovascular: Negative for chest pain, palpitations and leg swelling.   Gastrointestinal: Negative for abdominal pain, blood in stool, constipation, diarrhea, nausea and vomiting.   Endocrine: Negative for cold intolerance, heat intolerance, polydipsia and polyuria.   Genitourinary: Negative for dysuria, frequency, hematuria and urgency.   Skin: Negative for rash.   Neurological: Negative for syncope and weakness.        Objective     Visit Vitals  /94   Pulse 92   Temp 98.3 °F (36.8 °C) (Oral)   Ht 175.3 cm (69\")   Wt 103 kg (226 lb)   SpO2 99%   BMI 33.37 kg/m²     Physical Exam   Constitutional: He is oriented to person, place, and time. He appears well-developed and well-nourished. No distress.   Obese male.    HENT:   Head: Normocephalic and atraumatic.   Nose: Right sinus exhibits no maxillary sinus tenderness and no frontal sinus tenderness. Left sinus exhibits no maxillary sinus tenderness and no frontal sinus tenderness.   Mouth/Throat: Uvula is midline, oropharynx is clear and moist and mucous membranes are normal. No oral lesions. No tonsillar exudate.   Eyes: Pupils are equal, round, and reactive to light. Conjunctivae and EOM are normal.   Neck: Trachea normal. Neck supple. No JVD present. Carotid bruit is not present. No tracheal deviation present. No thyroid mass and no thyromegaly present.   Cardiovascular: Normal rate, regular rhythm, normal heart sounds and intact distal pulses.  No extrasystoles are present. PMI is not " displaced.   No murmur heard.  Pulmonary/Chest: Effort normal and breath sounds normal. No accessory muscle usage. No respiratory distress. He has no decreased breath sounds. He has no wheezes. He has no rhonchi. He has no rales.   Abdominal: Soft. Bowel sounds are normal. He exhibits no distension. There is no hepatosplenomegaly. There is no tenderness.   Obese abdomen limits exam.     Vascular Status -  His right foot exhibits normal foot vasculature  and no edema. His left foot exhibits normal foot vasculature  and no edema.  Lymphadenopathy:     He has no cervical adenopathy.   Neurological: He is alert and oriented to person, place, and time. No cranial nerve deficit. Coordination normal.   Skin: Skin is warm, dry and intact. No rash noted. No cyanosis. Nails show no clubbing.   Psychiatric: He has a normal mood and affect. His speech is normal and behavior is normal. Judgment and thought content normal.   Vitals reviewed.        Assessment/Plan      Increase Toujeo to 14 units daily.  Continue with the current doses of Amaryl and metformin.  Decrease carbohydrates including decreasing the consumption of hard liquor.  Intensify diabetic diet efforts and try to resume regular exercise.  He states he used to walk 10 miles several days weekly.   Monitor blood glucose alternating morning and evening with a goal of keeping glucose consistently 150 or lower.      To help lower BP and heart rate, a prescription is sent for metoprolol XL (Toprol XL) 25 mg daily.   Continue the lisinopril HCTZ 20/12.5 mg b.i.d.     Information given on Cologuard testing in this patient who is adamantly refusing colonoscopy.     Patient declines influenza vaccine.      Continue with the Lexapro 20 mg daily.      Continue the pravastatin and dietary efforts.    Continue other medications and vitamin and mineral supplements to treat additional medical problems which we addressed today.  Return in six months for follow up with fasting labs  one week prior.        Scribed for Dr. Cadena by Yoko Johnson [facility].     Diagnoses and all orders for this visit:    Type 2 diabetes mellitus with diabetic polyneuropathy, without long-term current use of insulin (CMS/[facility])  -     CBC Auto Differential; Future  -     Comprehensive Metabolic Panel; Future  -     Hemoglobin A1c; Future  -     Microalbumin / Creatinine Urine Ratio - Urine, Clean Catch; Future  -     TSH; Future  -     C-Peptide; Future    Essential hypertension    Mixed hyperlipidemia  -     Lipid Panel; Future    Class 1 obesity due to excess calories with serious comorbidity and body mass index (BMI) of 31.0 to 31.9 in adult    Generalized anxiety disorder    Major depressive disorder without psychotic features    History of alcohol abuse    Special screening for malignant neoplasm of prostate  -     PSA Screen; Future    Other orders  -     metoprolol succinate XL (TOPROL XL) 25 MG 24 hr tablet; Take 1 tablet by mouth Daily.  -     Insulin Glargine, 2 Unit Dial, (TOUJEO MAX SOLOSTAR) 300 UNIT/ML solution pen-injector injection; Inject 14 Units under the skin into the appropriate area as directed Every Evening.        Lab on 01/10/2020   Component Date Value Ref Range Status   • WBC 01/10/2020 4.56  3.40 - 10.80 10*3/mm3 Final   • RBC 01/10/2020 4.25  4.14 - 5.80 10*6/mm3 Final   • Hemoglobin 01/10/2020 12.4* 13.0 - 17.7 g/dL Final   • Hematocrit 01/10/2020 34.0* 37.5 - 51.0 % Final   • MCV 01/10/2020 80.0  79.0 - 97.0 fL Final   • MCH 01/10/2020 29.2  26.6 - 33.0 pg Final   • MCHC 01/10/2020 36.5* 31.5 - 35.7 g/dL Final   • RDW 01/10/2020 12.7  12.3 - 15.4 % Final   • RDW-SD 01/10/2020 35.8* 37.0 - 54.0 fl Final   • MPV 01/10/2020 10.8  6.0 - 12.0 fL Final   • Platelets 01/10/2020 96* 140 - 450 10*3/mm3 Final   • Glucose 01/10/2020 229* 70 - 99 mg/dL Final   • BUN 01/10/2020 18  7 - 23 mg/dL Final   • Creatinine 01/10/2020 1.19  0.70 - 1.30 mg/dL Final   • Sodium 01/10/2020 134* 137 - 145 mmol/L  Final   • Potassium 01/10/2020 4.4  3.4 - 5.0 mmol/L Final   • Chloride 01/10/2020 95* 101 - 112 mmol/L Final   • CO2 01/10/2020 31.0* 22.0 - 30.0 mmol/L Final   • Calcium 01/10/2020 8.7  8.4 - 10.2 mg/dL Final   • Total Protein 01/10/2020 6.9  6.3 - 8.6 g/dL Final   • Albumin 01/10/2020 3.80  3.50 - 5.00 g/dL Final   • ALT (SGPT) 01/10/2020 48  <=50 U/L Final   • AST (SGOT) 01/10/2020 61* 17 - 59 U/L Final   • Alkaline Phosphatase 01/10/2020 131* 38 - 126 U/L Final   • Total Bilirubin 01/10/2020 1.0  0.2 - 1.3 mg/dL Final   • eGFR Non African Amer 01/10/2020 63  56 - 130 mL/min/1.73 Final   • Globulin 01/10/2020 3.1  2.3 - 3.5 gm/dL Final   • A/G Ratio 01/10/2020 1.2  1.1 - 1.8 g/dL Final   • BUN/Creatinine Ratio 01/10/2020 15.1  7.0 - 25.0 Final   • Anion Gap 01/10/2020 8.0  5.0 - 15.0 mmol/L Final   • Hemoglobin A1C 01/10/2020 7.71* 4.80 - 5.60 % Final   • Total Cholesterol 01/10/2020 129* 150 - 200 mg/dL Final   • Triglycerides 01/10/2020 168* <=150 mg/dL Final   • HDL Cholesterol 01/10/2020 34* 40 - 59 mg/dL Final   • LDL Cholesterol  01/10/2020 61  <=100 mg/dL Final   • VLDL Cholesterol 01/10/2020 33.6  mg/dL Final   • LDL/HDL Ratio 01/10/2020 1.81  0.00 - 3.55 Final   • Neutrophil % 01/10/2020 36.0* 42.7 - 76.0 % Final   • Lymphocyte % 01/10/2020 45.0  19.6 - 45.3 % Final   • Monocyte % 01/10/2020 12.0  5.0 - 12.0 % Final   • Eosinophil % 01/10/2020 6.0  0.3 - 6.2 % Final   • Basophil % 01/10/2020 1.0  0.0 - 1.5 % Final   • Neutrophils Absolute 01/10/2020 1.64* 1.70 - 7.00 10*3/mm3 Final   • Lymphocytes Absolute 01/10/2020 2.05  0.70 - 3.10 10*3/mm3 Final   • Monocytes Absolute 01/10/2020 0.55  0.10 - 0.90 10*3/mm3 Final   • Eosinophils Absolute 01/10/2020 0.27  0.00 - 0.40 10*3/mm3 Final   • Basophils Absolute 01/10/2020 0.05  0.00 - 0.20 10*3/mm3 Final   • Microcytes 01/10/2020 Slight/1+  None Seen Final   • WBC Morphology 01/10/2020 Normal  Normal Final   • Platelet Estimate 01/10/2020 Decreased  Normal  Final   • Giant Platelets 01/10/2020 Slight/1+  None Seen Final   ]

## 2020-01-28 NOTE — PATIENT INSTRUCTIONS

## 2020-03-11 DIAGNOSIS — E11.42 TYPE 2 DIABETES MELLITUS WITH DIABETIC POLYNEUROPATHY, WITHOUT LONG-TERM CURRENT USE OF INSULIN (HCC): Chronic | ICD-10-CM

## 2020-03-11 RX ORDER — GLIMEPIRIDE 4 MG/1
4 TABLET ORAL 2 TIMES DAILY
Qty: 180 TABLET | Refills: 3 | Status: SHIPPED | OUTPATIENT
Start: 2020-03-11 | End: 2021-02-19 | Stop reason: SDUPTHER

## 2020-03-17 RX ORDER — ESCITALOPRAM OXALATE 20 MG/1
20 TABLET ORAL DAILY
Qty: 90 TABLET | Refills: 3 | Status: SHIPPED | OUTPATIENT
Start: 2020-03-17 | End: 2020-09-16

## 2020-04-27 DIAGNOSIS — E11.42 TYPE 2 DIABETES MELLITUS WITH DIABETIC POLYNEUROPATHY, WITHOUT LONG-TERM CURRENT USE OF INSULIN (HCC): Chronic | ICD-10-CM

## 2020-04-28 RX ORDER — NAPROXEN 500 MG/1
500 TABLET ORAL 2 TIMES DAILY WITH MEALS
Qty: 180 TABLET | Refills: 1 | Status: SHIPPED | OUTPATIENT
Start: 2020-04-28 | End: 2020-08-28 | Stop reason: SDUPTHER

## 2020-04-28 RX ORDER — LISINOPRIL AND HYDROCHLOROTHIAZIDE 20; 12.5 MG/1; MG/1
1 TABLET ORAL 2 TIMES DAILY
Qty: 180 TABLET | Refills: 3 | Status: SHIPPED | OUTPATIENT
Start: 2020-04-28 | End: 2021-04-12 | Stop reason: ALTCHOICE

## 2020-05-11 ENCOUNTER — TELEPHONE (OUTPATIENT)
Dept: FAMILY MEDICINE CLINIC | Facility: CLINIC | Age: 55
End: 2020-05-11

## 2020-05-11 NOTE — TELEPHONE ENCOUNTER
I spoke to patient with information and he voiced understanding. TP      ----- Message from Stephanie Conrad MA sent at 5/11/2020 12:10 PM CDT -----      ----- Message -----  From: Jono Cadena MD  Sent: 5/8/2020   3:56 PM CDT  To: Stephanie Conrad MA    Go ahead and use the samples, but when he gets down to starting his last pen have him contact us.  He can get a box of generic insulin pens at [facility] for approximately $45.  They will last quite a while.  The insulin will be different, and we can do a video appointment with him to explain how to use it, but it will still just be a once a day shot that he would take with supper.  EB  ----- Message -----  From: Stephanie Conrad MA  Sent: 5/8/2020   2:15 PM CDT  To: Jono Cadena MD    [facility] called and states that the patient can not afford the toujio pen even with the co pay card its $300 is there something else we can change that to, I did Find some samples from  Another office and gave him those to get him thru till we can order something else thanks

## 2020-06-04 ENCOUNTER — PRIOR AUTHORIZATION (OUTPATIENT)
Dept: FAMILY MEDICINE CLINIC | Facility: CLINIC | Age: 55
End: 2020-06-04

## 2020-06-30 ENCOUNTER — OFFICE VISIT (OUTPATIENT)
Dept: FAMILY MEDICINE CLINIC | Facility: CLINIC | Age: 55
End: 2020-06-30

## 2020-06-30 VITALS — BODY MASS INDEX: 33.47 KG/M2 | WEIGHT: 226 LBS | HEIGHT: 69 IN

## 2020-06-30 DIAGNOSIS — Z79.4 TYPE 2 DIABETES MELLITUS WITH DIABETIC POLYNEUROPATHY, WITH LONG-TERM CURRENT USE OF INSULIN (HCC): Chronic | ICD-10-CM

## 2020-06-30 DIAGNOSIS — Z91.89 AT RISK FOR DIABETIC FOOT ULCER: ICD-10-CM

## 2020-06-30 DIAGNOSIS — E11.42 TYPE 2 DIABETES MELLITUS WITH DIABETIC POLYNEUROPATHY, WITH LONG-TERM CURRENT USE OF INSULIN (HCC): Chronic | ICD-10-CM

## 2020-06-30 DIAGNOSIS — T81.31XA WOUND DEHISCENCE, SURGICAL, INITIAL ENCOUNTER: Primary | ICD-10-CM

## 2020-06-30 DIAGNOSIS — S91.112D LACERATION OF LEFT GREAT TOE WITHOUT FOREIGN BODY PRESENT OR DAMAGE TO NAIL, SUBSEQUENT ENCOUNTER: ICD-10-CM

## 2020-06-30 PROCEDURE — 99442 PR PHYS/QHP TELEPHONE EVALUATION 11-20 MIN: CPT | Performed by: INTERNAL MEDICINE

## 2020-06-30 RX ORDER — SULFAMETHOXAZOLE AND TRIMETHOPRIM 800; 160 MG/1; MG/1
1 TABLET ORAL 2 TIMES DAILY
Qty: 14 TABLET | Refills: 0 | Status: SHIPPED | OUTPATIENT
Start: 2020-06-30 | End: 2020-07-07

## 2020-06-30 NOTE — PROGRESS NOTES
Subjective     History of Present Illness     You have chosen to receive care through a telephone visit. Do you consent to use a telephone visit for your medical care today? Yes    Total visit time: 14 minutes.      Wesly Menezes is a 54 y.o. male who receives care today to address recent injury to left great toe.  Patient was seen in the ER at [facility] on 05/28/2020 after his left foot slipped on lawnmower deck and caught left great toe on belt resulting in 6 x 3 cm open laceration to outer left great toe.  He was wearing flip flops and had taken the protective guard off of the . He sent pictures of his wound prior to appointment revealing 2 areas of the laceration have opened up and are draining small amounts of yellow serous fluid.  Denies purtulant drainage, warmth, tenderness, or red streaking.  He was prescribed Ultram and a 7-day course of Keflex 500 mg to take one t.i.d., but that was a month ago.  He was given a tetanus booster.  He started soaking foot in warm salt water once daily four days ago and is starting to notice improvement.  He has been wearing a sock at night due to the drainage.       He continues to describe diabetic neuropathy to the bilateral feet due to years of inadequate diabetic management in the past.        Review of Systems   Constitutional: Negative for chills, fatigue and fever.   HENT: Negative for congestion, ear pain, postnasal drip, sinus pressure and sore throat.    Respiratory: Negative for cough, shortness of breath and wheezing.    Cardiovascular: Negative for chest pain, palpitations and leg swelling.   Gastrointestinal: Negative for abdominal pain, blood in stool, constipation, diarrhea, nausea and vomiting.   Endocrine: Negative for cold intolerance, heat intolerance, polydipsia and polyuria.   Genitourinary: Negative for dysuria, frequency, hematuria and urgency.   Skin: Positive for wound (Left toe laceration). Negative for rash.   Neurological: Negative for  "syncope and weakness.        Objective     Visit Vitals  Ht 175.3 cm (69\")   Wt 103 kg (226 lb)   BMI 33.37 kg/m²       Physical Exam    Future Appointments   Date Time Provider Department Center   7/31/2020  2:30 PM Jnoo Cadena MD [facility] None       Assessment/Plan      Wound care instructions given.  Continue soaking the foot in warm salt water once daily.  I asked him to make sure he sanitizes the pan thoroughly each day after he soaks the foot.  A prescription is sent for Bactrim DS to take one b.i.d. X 7 days and Bactroban ointment to apply t.i.d. Cover with nonstick 2 x 2 dressing and wrap with gauze.  Refer to [place] [facility], to provide additional wound care in this diabetic patient with foot laceration with wound dehiscence.  It looks like some debridement is needed.    Continue the current diabetic management and monitor glucose closely.  Notify me if not consistently at goal.    Return in a month for routine follow up with fasting labs one week prior.     Scribed for Dr. Cadena by Yoko Johnson [facility].     Diagnoses and all orders for this visit:    Wound dehiscence, surgical, initial encounter  -     Ambulatory Referral to Podiatry    Laceration of left great toe without foreign body present or damage to nail, subsequent encounter  -     Ambulatory Referral to Podiatry    At risk for diabetic foot ulcer  -     Ambulatory Referral to Podiatry    Type 2 diabetes mellitus with diabetic polyneuropathy, with long-term current use of insulin (CMS/[facility])  -     Ambulatory Referral to Podiatry    Other orders  -     sulfamethoxazole-trimethoprim (BACTRIM DS,SEPTRA DS) 800-160 MG per tablet; Take 1 tablet by mouth 2 (Two) Times a Day for 14 doses.  -     mupirocin (BACTROBAN) 2 % ointment; Apply  topically to the appropriate area as directed 3 (Three) Times a Day.        No visits with results within 3 Week(s) from this visit.   Latest known visit with results is:   Lab on 01/10/2020   Component Date " Value Ref Range Status   • WBC 01/10/2020 4.56  3.40 - 10.80 10*3/mm3 Final   • RBC 01/10/2020 4.25  4.14 - 5.80 10*6/mm3 Final   • Hemoglobin 01/10/2020 12.4* 13.0 - 17.7 g/dL Final   • Hematocrit 01/10/2020 34.0* 37.5 - 51.0 % Final   • MCV 01/10/2020 80.0  79.0 - 97.0 fL Final   • MCH 01/10/2020 29.2  26.6 - 33.0 pg Final   • MCHC 01/10/2020 36.5* 31.5 - 35.7 g/dL Final   • RDW 01/10/2020 12.7  12.3 - 15.4 % Final   • RDW-SD 01/10/2020 35.8* 37.0 - 54.0 fl Final   • MPV 01/10/2020 10.8  6.0 - 12.0 fL Final   • Platelets 01/10/2020 96* 140 - 450 10*3/mm3 Final   • Glucose 01/10/2020 229* 70 - 99 mg/dL Final   • BUN 01/10/2020 18  7 - 23 mg/dL Final   • Creatinine 01/10/2020 1.19  0.70 - 1.30 mg/dL Final   • Sodium 01/10/2020 134* 137 - 145 mmol/L Final   • Potassium 01/10/2020 4.4  3.4 - 5.0 mmol/L Final   • Chloride 01/10/2020 95* 101 - 112 mmol/L Final   • CO2 01/10/2020 31.0* 22.0 - 30.0 mmol/L Final   • Calcium 01/10/2020 8.7  8.4 - 10.2 mg/dL Final   • Total Protein 01/10/2020 6.9  6.3 - 8.6 g/dL Final   • Albumin 01/10/2020 3.80  3.50 - 5.00 g/dL Final   • ALT (SGPT) 01/10/2020 48  <=50 U/L Final   • AST (SGOT) 01/10/2020 61* 17 - 59 U/L Final   • Alkaline Phosphatase 01/10/2020 131* 38 - 126 U/L Final   • Total Bilirubin 01/10/2020 1.0  0.2 - 1.3 mg/dL Final   • eGFR Non African Amer 01/10/2020 63  56 - 130 mL/min/1.73 Final   • Globulin 01/10/2020 3.1  2.3 - 3.5 gm/dL Final   • A/G Ratio 01/10/2020 1.2  1.1 - 1.8 g/dL Final   • BUN/Creatinine Ratio 01/10/2020 15.1  7.0 - 25.0 Final   • Anion Gap 01/10/2020 8.0  5.0 - 15.0 mmol/L Final   • Hemoglobin A1C 01/10/2020 7.71* 4.80 - 5.60 % Final   • Total Cholesterol 01/10/2020 129* 150 - 200 mg/dL Final   • Triglycerides 01/10/2020 168* <=150 mg/dL Final   • HDL Cholesterol 01/10/2020 34* 40 - 59 mg/dL Final   • LDL Cholesterol  01/10/2020 61  <=100 mg/dL Final   • VLDL Cholesterol 01/10/2020 33.6  mg/dL Final   • LDL/HDL Ratio 01/10/2020 1.81  0.00 - 3.55  Final   • Neutrophil % 01/10/2020 36.0* 42.7 - 76.0 % Final   • Lymphocyte % 01/10/2020 45.0  19.6 - 45.3 % Final   • Monocyte % 01/10/2020 12.0  5.0 - 12.0 % Final   • Eosinophil % 01/10/2020 6.0  0.3 - 6.2 % Final   • Basophil % 01/10/2020 1.0  0.0 - 1.5 % Final   • Neutrophils Absolute 01/10/2020 1.64* 1.70 - 7.00 10*3/mm3 Final   • Lymphocytes Absolute 01/10/2020 2.05  0.70 - 3.10 10*3/mm3 Final   • Monocytes Absolute 01/10/2020 0.55  0.10 - 0.90 10*3/mm3 Final   • Eosinophils Absolute 01/10/2020 0.27  0.00 - 0.40 10*3/mm3 Final   • Basophils Absolute 01/10/2020 0.05  0.00 - 0.20 10*3/mm3 Final   • Microcytes 01/10/2020 Slight/1+  None Seen Final   • WBC Morphology 01/10/2020 Normal  Normal Final   • Platelet Estimate 01/10/2020 Decreased  Normal Final   • Giant Platelets 01/10/2020 Slight/1+  None Seen Final   ]

## 2020-07-16 ENCOUNTER — TELEPHONE (OUTPATIENT)
Dept: FAMILY MEDICINE CLINIC | Facility: CLINIC | Age: 55
End: 2020-07-16

## 2020-07-16 ENCOUNTER — LAB (OUTPATIENT)
Dept: LAB | Facility: OTHER | Age: 55
End: 2020-07-16

## 2020-07-16 DIAGNOSIS — E11.42 TYPE 2 DIABETES MELLITUS WITH DIABETIC POLYNEUROPATHY, WITHOUT LONG-TERM CURRENT USE OF INSULIN (HCC): Chronic | ICD-10-CM

## 2020-07-16 DIAGNOSIS — E78.2 MIXED HYPERLIPIDEMIA: Chronic | ICD-10-CM

## 2020-07-16 DIAGNOSIS — E11.9 TYPE 2 DIABETES MELLITUS WITHOUT COMPLICATION, WITH LONG-TERM CURRENT USE OF INSULIN (HCC): ICD-10-CM

## 2020-07-16 DIAGNOSIS — Z79.4 TYPE 2 DIABETES MELLITUS WITHOUT COMPLICATION, WITH LONG-TERM CURRENT USE OF INSULIN (HCC): ICD-10-CM

## 2020-07-16 DIAGNOSIS — Z12.5 SPECIAL SCREENING FOR MALIGNANT NEOPLASM OF PROSTATE: ICD-10-CM

## 2020-07-16 LAB
ALBUMIN SERPL-MCNC: 4.3 G/DL (ref 3.5–5)
ALBUMIN UR-MCNC: 43 MG/DL
ALBUMIN/GLOB SERPL: 1.3 G/DL (ref 1.1–1.8)
ALP SERPL-CCNC: 125 U/L (ref 38–126)
ALT SERPL W P-5'-P-CCNC: 18 U/L
ANION GAP SERPL CALCULATED.3IONS-SCNC: 17 MMOL/L (ref 5–15)
AST SERPL-CCNC: 19 U/L (ref 17–59)
BASOPHILS # BLD AUTO: 0.11 10*3/MM3 (ref 0–0.2)
BASOPHILS NFR BLD AUTO: 1.1 % (ref 0–1.5)
BILIRUB SERPL-MCNC: 0.9 MG/DL (ref 0.2–1.3)
BUN SERPL-MCNC: 31 MG/DL (ref 7–23)
BUN/CREAT SERPL: 18.7 (ref 7–25)
CALCIUM SPEC-SCNC: 10 MG/DL (ref 8.4–10.2)
CHLORIDE SERPL-SCNC: 89 MMOL/L (ref 101–112)
CHOLEST SERPL-MCNC: 224 MG/DL (ref 150–200)
CO2 SERPL-SCNC: 23 MMOL/L (ref 22–30)
CREAT SERPL-MCNC: 1.66 MG/DL (ref 0.7–1.3)
CREAT UR-MCNC: 89.1 MG/DL
DEPRECATED RDW RBC AUTO: 36.4 FL (ref 37–54)
EOSINOPHIL # BLD AUTO: 0.18 10*3/MM3 (ref 0–0.4)
EOSINOPHIL NFR BLD AUTO: 1.8 % (ref 0.3–6.2)
ERYTHROCYTE [DISTWIDTH] IN BLOOD BY AUTOMATED COUNT: 12.9 % (ref 12.3–15.4)
GFR SERPL CREATININE-BSD FRML MDRD: 43 ML/MIN/1.73 (ref 56–130)
GLOBULIN UR ELPH-MCNC: 3.4 GM/DL (ref 2.3–3.5)
GLUCOSE SERPL-MCNC: 609 MG/DL (ref 70–99)
HBA1C MFR BLD: 11.28 % (ref 4.8–5.6)
HCT VFR BLD AUTO: 36.2 % (ref 37.5–51)
HDLC SERPL-MCNC: 39 MG/DL (ref 40–59)
HGB BLD-MCNC: 13 G/DL (ref 13–17.7)
LDLC SERPL CALC-MCNC: 111 MG/DL
LDLC/HDLC SERPL: 2.84 {RATIO} (ref 0–3.55)
LYMPHOCYTES # BLD AUTO: 1.55 10*3/MM3 (ref 0.7–3.1)
LYMPHOCYTES NFR BLD AUTO: 15.5 % (ref 19.6–45.3)
MCH RBC QN AUTO: 28.8 PG (ref 26.6–33)
MCHC RBC AUTO-ENTMCNC: 35.9 G/DL (ref 31.5–35.7)
MCV RBC AUTO: 80.3 FL (ref 79–97)
MICROALBUMIN/CREAT UR: 482.6 MG/G
MONOCYTES # BLD AUTO: 0.59 10*3/MM3 (ref 0.1–0.9)
MONOCYTES NFR BLD AUTO: 5.9 % (ref 5–12)
NEUTROPHILS NFR BLD AUTO: 7.54 10*3/MM3 (ref 1.7–7)
NEUTROPHILS NFR BLD AUTO: 75.7 % (ref 42.7–76)
PLATELET # BLD AUTO: 266 10*3/MM3 (ref 140–450)
PMV BLD AUTO: 9.9 FL (ref 6–12)
POTASSIUM SERPL-SCNC: 4.9 MMOL/L (ref 3.4–5)
PROT SERPL-MCNC: 7.7 G/DL (ref 6.3–8.6)
PSA SERPL-MCNC: 0.41 NG/ML (ref 0–4)
RBC # BLD AUTO: 4.51 10*6/MM3 (ref 4.14–5.8)
SODIUM SERPL-SCNC: 129 MMOL/L (ref 137–145)
TRIGL SERPL-MCNC: 372 MG/DL
TSH SERPL DL<=0.05 MIU/L-ACNC: 2.43 UIU/ML (ref 0.27–4.2)
VLDLC SERPL-MCNC: 74.4 MG/DL
WBC # BLD AUTO: 9.97 10*3/MM3 (ref 3.4–10.8)

## 2020-07-16 PROCEDURE — 83036 HEMOGLOBIN GLYCOSYLATED A1C: CPT | Performed by: INTERNAL MEDICINE

## 2020-07-16 PROCEDURE — G0103 PSA SCREENING: HCPCS | Performed by: INTERNAL MEDICINE

## 2020-07-16 PROCEDURE — 80053 COMPREHEN METABOLIC PANEL: CPT | Performed by: INTERNAL MEDICINE

## 2020-07-16 PROCEDURE — 80061 LIPID PANEL: CPT | Performed by: INTERNAL MEDICINE

## 2020-07-16 PROCEDURE — 82043 UR ALBUMIN QUANTITATIVE: CPT | Performed by: INTERNAL MEDICINE

## 2020-07-16 PROCEDURE — 84443 ASSAY THYROID STIM HORMONE: CPT | Performed by: INTERNAL MEDICINE

## 2020-07-16 PROCEDURE — 85025 COMPLETE CBC W/AUTO DIFF WBC: CPT | Performed by: INTERNAL MEDICINE

## 2020-07-16 PROCEDURE — 82570 ASSAY OF URINE CREATININE: CPT | Performed by: INTERNAL MEDICINE

## 2020-07-16 PROCEDURE — 84681 ASSAY OF C-PEPTIDE: CPT | Performed by: INTERNAL MEDICINE

## 2020-07-16 RX ORDER — BLOOD-GLUCOSE METER
KIT MISCELLANEOUS
Qty: 1 EACH | Refills: 0 | Status: SHIPPED | OUTPATIENT
Start: 2020-07-16

## 2020-07-16 NOTE — TELEPHONE ENCOUNTER
07/16/2020- lab called with critical for glucose level of 609  was notified asap I called the patient and made him aware that this is very important to take his diabetic meds he has not taken them 1 week says he doesn't feel like taken those right now, he was advised to do so or he would end up in hospital, he has been non-complaint with Meds and not taking his insulin, he was advised to test blood sugars 3 xs daily and call back with those results, I have sent in new supplies to the pharmacy for him

## 2020-07-17 LAB — C PEPTIDE SERPL-MCNC: 3.5 NG/ML (ref 1.1–4.4)

## 2020-07-20 ENCOUNTER — TELEPHONE (OUTPATIENT)
Dept: FAMILY MEDICINE CLINIC | Facility: CLINIC | Age: 55
End: 2020-07-20

## 2020-07-20 NOTE — TELEPHONE ENCOUNTER
07/20/20 I called to inform patient  but he is currently in the hospital for elevated glucose. TP      ----- Message from Jono Cadena MD sent at 7/17/2020  4:32 PM CDT -----  Ask him to bring in his glucometer and diabetic diary for his appointment next Tuesday.  EB  ----- Message -----  From: Vilma Merida RN  Sent: 7/17/2020   2:59 PM CDT  To: Jono Cadena MD    He called and states his glucose is 492 today. He hs started back on his diabetic meds. TP

## 2020-08-27 ENCOUNTER — OFFICE VISIT (OUTPATIENT)
Dept: FAMILY MEDICINE CLINIC | Facility: CLINIC | Age: 55
End: 2020-08-27

## 2020-08-27 VITALS — BODY MASS INDEX: 33.47 KG/M2 | WEIGHT: 226 LBS | HEIGHT: 69 IN

## 2020-08-27 DIAGNOSIS — Z79.4 TYPE 2 DIABETES MELLITUS WITH DIABETIC POLYNEUROPATHY, WITH LONG-TERM CURRENT USE OF INSULIN (HCC): Primary | Chronic | ICD-10-CM

## 2020-08-27 DIAGNOSIS — E11.65 TYPE 2 DIABETES MELLITUS WITH HYPERGLYCEMIA, WITH LONG-TERM CURRENT USE OF INSULIN (HCC): ICD-10-CM

## 2020-08-27 DIAGNOSIS — E11.42 TYPE 2 DIABETES MELLITUS WITH DIABETIC POLYNEUROPATHY, WITH LONG-TERM CURRENT USE OF INSULIN (HCC): Primary | Chronic | ICD-10-CM

## 2020-08-27 DIAGNOSIS — E66.09 CLASS 1 OBESITY DUE TO EXCESS CALORIES WITH SERIOUS COMORBIDITY AND BODY MASS INDEX (BMI) OF 33.0 TO 33.9 IN ADULT: Chronic | ICD-10-CM

## 2020-08-27 DIAGNOSIS — Z79.4 TYPE 2 DIABETES MELLITUS WITH HYPERGLYCEMIA, WITH LONG-TERM CURRENT USE OF INSULIN (HCC): ICD-10-CM

## 2020-08-27 DIAGNOSIS — E11.42 DIABETIC PERIPHERAL NEUROPATHY ASSOCIATED WITH TYPE 2 DIABETES MELLITUS (HCC): Chronic | ICD-10-CM

## 2020-08-27 DIAGNOSIS — E78.5 HYPERLIPIDEMIA ASSOCIATED WITH TYPE 2 DIABETES MELLITUS (HCC): Chronic | ICD-10-CM

## 2020-08-27 DIAGNOSIS — Z91.199 NONCOMPLIANCE WITH DIABETES TREATMENT: ICD-10-CM

## 2020-08-27 DIAGNOSIS — E11.69 HYPERLIPIDEMIA ASSOCIATED WITH TYPE 2 DIABETES MELLITUS (HCC): Chronic | ICD-10-CM

## 2020-08-27 DIAGNOSIS — F32.9 MAJOR DEPRESSIVE DISORDER WITHOUT PSYCHOTIC FEATURES: Chronic | ICD-10-CM

## 2020-08-27 PROCEDURE — 99443 PR PHYS/QHP TELEPHONE EVALUATION 21-30 MIN: CPT | Performed by: INTERNAL MEDICINE

## 2020-08-27 NOTE — PROGRESS NOTES
Subjective     You have chosen to receive care through a telephone visit. Do you consent to use a telephone visit for your medical care today? Yes  Total visit time: 21 minutes      Wesly Menezes is a 54 y.o. male.     History of Present Illness     Wesly is a very difficult patient to manage.  He has historically been a poorly controlled diabetic and is now experiencing many complications of his diabetes including foot ulcers and diabetic peripheral neuropathy and erectile dysfunction.  He is high risk for cardiovascular events.  Over the course of the past couple years, we have been able to get his A1c less than 8 with oral agents and small amounts of insulin, but the patient stopped taking all of his medications for some reason earlier this year and his A1c this summer jumped to 11.2.  His cholesterol doubled.  He is still quite vague about why he stopped all of his medications, and none of the explanations make sense to me.  He has struggled with depression in the past, and may have some degree of bipolar depression.  That was quite possibly playing a role.  He did not keep an appointment with me after his labs, but we related the poor control, and he has resumed his oral agents, but not taking his basal insulin.  He has checked his glucose 16 times this month.  Glucose was above 200 on 6 of those occasions and between 150 and 200 on the other 10 occasions.  He is reporting that he is afraid of hypoglycemia.  In order to get him used to taking insulin again, we compromised today.  See below.    The [place] podiatrist has been providing wound care for his diabetic toe ulcer that developed from a wound that he received while using a lawnmower without a guard over the belt.  See prior note.  He reports that the wound has finally healed and he has a follow-up appointment with podiatry next week.    He reports that he has applied for disability Social Security due to his diabetic feet and other issues.  He  "describes worsening diabetic peripheral neuropathy symptoms.    He reports that he is trying to be at least somewhat compliant with diabetic diet, which he admits he was not doing earlier this year.    We had a long talk about the microvascular and macrovascular complications of uncontrolled diabetes.  I explained that many of the medical issues he is currently dealing with are due to his years of uncontrolled diabetes, and many of the changes will be permanent, but complications can still be reduced by managing his diabetes well.  He seems to have very poor coping skills.  For example, at one point today, he tells me that when he started having some mild hypoglycemia earlier this year, he stopped taking the insulin and all oral medications, instead of contacting us for instructions on how to reduce his insulin dose.    He had a very angry encounter with my office staff earlier this summer, using extremely foul language.  He walked and demanded treatment when we had no appointment slots available.    Review of Systems   Constitutional: Negative for chills, fatigue and fever.   HENT: Negative for congestion, ear pain, postnasal drip, sinus pressure and sore throat.    Respiratory: Negative for cough, shortness of breath and wheezing.    Cardiovascular: Negative for chest pain, palpitations and leg swelling.   Gastrointestinal: Negative for abdominal pain, blood in stool, constipation, diarrhea, nausea and vomiting.   Endocrine: Negative for cold intolerance, heat intolerance, polydipsia and polyuria.   Genitourinary: Negative for dysuria, frequency, hematuria and urgency.   Skin: Negative for rash.   Neurological: Positive for numbness. Negative for syncope and weakness.   Psychiatric/Behavioral: Positive for agitation and dysphoric mood. The patient is nervous/anxious.        Objective     Ht 175.3 cm (69\")   Wt 103 kg (226 lb) Comment: patient record  BMI 33.37 kg/m²     Physical Exam    PHQ-2/PHQ-9 Depression " Screening 7/1/2019   Little interest or pleasure in doing things 0   Feeling down, depressed, or hopeless 1   Trouble falling or staying asleep, or sleeping too much -   Feeling tired or having little energy -   Poor appetite or overeating -   Feeling bad about yourself - or that you are a failure or have let yourself or your family down -   Trouble concentrating on things, such as reading the newspaper or watching television -   Moving or speaking so slowly that other people could have noticed. Or the opposite - being so fidgety or restless that you have been moving around a lot more than usual -   Thoughts that you would be better off dead, or of hurting yourself in some way -   Total Score 1   If you checked off any problems, how difficult have these problems made it for you to do your work, take care of things at home, or get along with other people? -       Assessment/Plan     I spent over 20 minutes today trying to reestablish a reasonable rapport with this patient, with hopes that that will allow us to once again help him get his disease processes under control.    I have asked the patient to be compliant with all of his oral medications.  In order to get him used to taking some insulin again and to help overcome his fear of hypoglycemia, I have asked him to take 8 units of Toujeo daily whenever his glucose is greater than 200.  Over time, I hope to be able to get him back to taking some basal insulin every day, perhaps with instructions for a higher dose to be taken when glucose is significantly above goal.  I have encouraged him not to stop medications without consulting me.    He states that he has resumed his pravastatin.  Hopefully, lipids will be vastly improved with next set of labs.  I will try to plan on repeating next labs in late November, approximately 4 months from his last set of labs.    In the future, I would like to try to spend some more time dealing with some of his emotional issues.  He is  now back on Lexapro, and seems to be better.    We will have a repeat telephone visit with him in 3 weeks.  I have asked him to check glucose twice daily and have those results available with his next visit.    Diagnoses and all orders for this visit:    Type 2 diabetes mellitus with diabetic polyneuropathy, with long-term current use of insulin (CMS/[facility])    Type 2 diabetes mellitus with hyperglycemia, with long-term current use of insulin (CMS/[facility])    Noncompliance with diabetes treatment    Class 1 obesity due to excess calories with serious comorbidity and body mass index (BMI) of 33.0 to 33.9 in adult    Diabetic peripheral neuropathy associated with type 2 diabetes mellitus (CMS/[facility])    Other orders  -     SITagliptin (JANUVIA) 100 MG tablet; Take 100 mg by mouth Daily.  -     Insulin Glargine, 2 Unit Dial, (Toujeo Max SoloStar) 300 UNIT/ML solution pen-injector injection; Inject 8 Units under the skin into the appropriate area as directed Every Evening. When glucose > 200        No visits with results within 3 Week(s) from this visit.   Latest known visit with results is:   Lab on 07/16/2020   Component Date Value Ref Range Status   • WBC 07/16/2020 9.97  3.40 - 10.80 10*3/mm3 Final   • RBC 07/16/2020 4.51  4.14 - 5.80 10*6/mm3 Final   • Hemoglobin 07/16/2020 13.0  13.0 - 17.7 g/dL Final   • Hematocrit 07/16/2020 36.2* 37.5 - 51.0 % Final   • MCV 07/16/2020 80.3  79.0 - 97.0 fL Final   • MCH 07/16/2020 28.8  26.6 - 33.0 pg Final   • MCHC 07/16/2020 35.9* 31.5 - 35.7 g/dL Final   • RDW 07/16/2020 12.9  12.3 - 15.4 % Final   • RDW-SD 07/16/2020 36.4* 37.0 - 54.0 fl Final   • MPV 07/16/2020 9.9  6.0 - 12.0 fL Final   • Platelets 07/16/2020 266  140 - 450 10*3/mm3 Final   • Neutrophil % 07/16/2020 75.7  42.7 - 76.0 % Final   • Lymphocyte % 07/16/2020 15.5* 19.6 - 45.3 % Final   • Monocyte % 07/16/2020 5.9  5.0 - 12.0 % Final   • Eosinophil % 07/16/2020 1.8  0.3 - 6.2 % Final   • Basophil % 07/16/2020 1.1  0.0 -  1.5 % Final   • Neutrophils, Absolute 07/16/2020 7.54* 1.70 - 7.00 10*3/mm3 Final   • Lymphocytes, Absolute 07/16/2020 1.55  0.70 - 3.10 10*3/mm3 Final   • Monocytes, Absolute 07/16/2020 0.59  0.10 - 0.90 10*3/mm3 Final   • Eosinophils, Absolute 07/16/2020 0.18  0.00 - 0.40 10*3/mm3 Final   • Basophils, Absolute 07/16/2020 0.11  0.00 - 0.20 10*3/mm3 Final   • Glucose 07/16/2020 609* 70 - 99 mg/dL Final   • BUN 07/16/2020 31* 7 - 23 mg/dL Final   • Creatinine 07/16/2020 1.66* 0.70 - 1.30 mg/dL Final   • Sodium 07/16/2020 129* 137 - 145 mmol/L Final   • Potassium 07/16/2020 4.9  3.4 - 5.0 mmol/L Final   • Chloride 07/16/2020 89* 101 - 112 mmol/L Final   • CO2 07/16/2020 23.0  22.0 - 30.0 mmol/L Final   • Calcium 07/16/2020 10.0  8.4 - 10.2 mg/dL Final   • Total Protein 07/16/2020 7.7  6.3 - 8.6 g/dL Final   • Albumin 07/16/2020 4.30  3.50 - 5.00 g/dL Final   • ALT (SGPT) 07/16/2020 18  <=50 U/L Final   • AST (SGOT) 07/16/2020 19  17 - 59 U/L Final   • Alkaline Phosphatase 07/16/2020 125  38 - 126 U/L Final   • Total Bilirubin 07/16/2020 0.9  0.2 - 1.3 mg/dL Final   • eGFR Non African Amer 07/16/2020 43* 56 - 130 mL/min/1.73 Final   • Globulin 07/16/2020 3.4  2.3 - 3.5 gm/dL Final   • A/G Ratio 07/16/2020 1.3  1.1 - 1.8 g/dL Final   • BUN/Creatinine Ratio 07/16/2020 18.7  7.0 - 25.0 Final   • Anion Gap 07/16/2020 17.0* 5.0 - 15.0 mmol/L Final   • Hemoglobin A1C 07/16/2020 11.28* 4.80 - 5.60 % Final   • Total Cholesterol 07/16/2020 224* 150 - 200 mg/dL Final   • Triglycerides 07/16/2020 372* <=150 mg/dL Final   • HDL Cholesterol 07/16/2020 39* 40 - 59 mg/dL Final   • LDL Cholesterol  07/16/2020 111* <=100 mg/dL Final   • VLDL Cholesterol 07/16/2020 74.4  mg/dL Final   • LDL/HDL Ratio 07/16/2020 2.84  0.00 - 3.55 Final   • PSA 07/16/2020 0.405  0.000 - 4.000 ng/mL Final   • TSH 07/16/2020 2.430  0.270 - 4.200 uIU/mL Final   • C-Peptide 07/16/2020 3.5  1.1 - 4.4 ng/mL Final    C-Peptide reference interval is for fasting  patients.   • Microalbumin/Creatinine Ratio 07/16/2020 482.6  mg/g Final   • Creatinine, Urine 07/16/2020 89.1  mg/dL Final   • Microalbumin, Urine 07/16/2020 43.0  mg/dL Final   ]

## 2020-08-28 DIAGNOSIS — E11.42 TYPE 2 DIABETES MELLITUS WITH DIABETIC POLYNEUROPATHY, WITHOUT LONG-TERM CURRENT USE OF INSULIN (HCC): Chronic | ICD-10-CM

## 2020-08-28 RX ORDER — NAPROXEN 500 MG/1
500 TABLET ORAL 2 TIMES DAILY WITH MEALS
Qty: 180 TABLET | Refills: 1 | Status: SHIPPED | OUTPATIENT
Start: 2020-08-28 | End: 2021-06-18 | Stop reason: SDUPTHER

## 2020-09-08 ENCOUNTER — TELEPHONE (OUTPATIENT)
Dept: FAMILY MEDICINE CLINIC | Facility: CLINIC | Age: 55
End: 2020-09-08

## 2020-09-08 RX ORDER — OMEPRAZOLE 40 MG/1
40 CAPSULE, DELAYED RELEASE ORAL DAILY
Qty: 30 CAPSULE | Refills: 5 | Status: SHIPPED | OUTPATIENT
Start: 2020-09-08 | End: 2020-10-02
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
## 2020-09-10 ENCOUNTER — OFFICE VISIT (OUTPATIENT)
Dept: FAMILY MEDICINE CLINIC | Facility: CLINIC | Age: 55
End: 2020-09-10

## 2020-09-10 VITALS — WEIGHT: 226 LBS | BODY MASS INDEX: 33.47 KG/M2 | HEIGHT: 69 IN

## 2020-09-10 DIAGNOSIS — E11.42 TYPE 2 DIABETES MELLITUS WITH DIABETIC POLYNEUROPATHY, WITH LONG-TERM CURRENT USE OF INSULIN (HCC): ICD-10-CM

## 2020-09-10 DIAGNOSIS — E11.65 TYPE 2 DIABETES MELLITUS WITH HYPERGLYCEMIA, WITH LONG-TERM CURRENT USE OF INSULIN (HCC): Primary | Chronic | ICD-10-CM

## 2020-09-10 DIAGNOSIS — E11.69 HYPERLIPIDEMIA ASSOCIATED WITH TYPE 2 DIABETES MELLITUS (HCC): Chronic | ICD-10-CM

## 2020-09-10 DIAGNOSIS — F41.1 GENERALIZED ANXIETY DISORDER: Chronic | ICD-10-CM

## 2020-09-10 DIAGNOSIS — E66.09 CLASS 1 OBESITY DUE TO EXCESS CALORIES WITH SERIOUS COMORBIDITY AND BODY MASS INDEX (BMI) OF 33.0 TO 33.9 IN ADULT: Chronic | ICD-10-CM

## 2020-09-10 DIAGNOSIS — E78.5 HYPERLIPIDEMIA ASSOCIATED WITH TYPE 2 DIABETES MELLITUS (HCC): Chronic | ICD-10-CM

## 2020-09-10 DIAGNOSIS — F32.9 MAJOR DEPRESSIVE DISORDER WITHOUT PSYCHOTIC FEATURES: Chronic | ICD-10-CM

## 2020-09-10 DIAGNOSIS — Z79.4 TYPE 2 DIABETES MELLITUS WITH DIABETIC POLYNEUROPATHY, WITH LONG-TERM CURRENT USE OF INSULIN (HCC): ICD-10-CM

## 2020-09-10 DIAGNOSIS — E11.42 DIABETIC PERIPHERAL NEUROPATHY ASSOCIATED WITH TYPE 2 DIABETES MELLITUS (HCC): Chronic | ICD-10-CM

## 2020-09-10 DIAGNOSIS — Z79.4 TYPE 2 DIABETES MELLITUS WITH HYPERGLYCEMIA, WITH LONG-TERM CURRENT USE OF INSULIN (HCC): Primary | Chronic | ICD-10-CM

## 2020-09-10 DIAGNOSIS — F10.10 ALCOHOL ABUSE: Chronic | ICD-10-CM

## 2020-09-10 PROCEDURE — 99443 PR PHYS/QHP TELEPHONE EVALUATION 21-30 MIN: CPT | Performed by: INTERNAL MEDICINE

## 2020-09-10 RX ORDER — GABAPENTIN 600 MG/1
TABLET ORAL
Qty: 30 TABLET | Refills: 2 | Status: SHIPPED | OUTPATIENT
Start: 2020-09-10 | End: 2020-09-10 | Stop reason: SDUPTHER

## 2020-09-10 RX ORDER — GABAPENTIN 600 MG/1
TABLET ORAL
Qty: 30 TABLET | Refills: 2 | Status: SHIPPED | OUTPATIENT
Start: 2020-09-10 | End: 2020-10-02 | Stop reason: SDUPTHER

## 2020-09-10 NOTE — PROGRESS NOTES
Subjective     Wesly Menezes is a 54 y.o. male.     History of Present Illness     You have chosen to receive care through a telephone visit. Do you consent to use a telephone visit for your medical care today? Yes  Total visit time: 22 minutes    Wesly is our very complex patient with diabetes and multiple other medical issues who is very difficult to manage historically due to multiple issues including alcohol abuse and noncompliance.  He is very high risk for cardiovascular events.  I had been able to get his A1c less than 7 over the past couple years, but A1c this past summer jumped to 11.2 for multiple reasons.  Although he denies medication noncompliance, his meds were not taken regularly for various reasons including episodic nausea likely related to antibiotics for a diabetic foot infection and also alcohol abuse.  We have discussed how the alcohol consumption greatly increases glucose.  He had stopped taking his insulin because of some hypoglycemia.  See prior note for more details.    For the past 3 weeks, he has been checking his glucose once daily and fingersticks have nearly always been ranging from 125-175, with the vast majority of the numbers less than 150.  He had been very leery to resume insulin due to the previous hypoglycemia.  He reports that he has greatly reduced alcohol consumption.  These numbers are on his oral agents only.  I have emphasized the need to stop the alcohol entirely.  He agrees to take 8 units of Toujeo on any day that is fasting glucose is greater than 200.  He is not meeting diabetic diet or weight loss goals.    He continues to complain of diabetic peripheral neuropathy issues causing chronic pain in the distal bilateral lower extremities primarily.  I again reviewed the cause of this being historically poorly controlled diabetes, will possibly with the alcohol contributing.  He does report that symptoms have improved somewhat with his improved diabetic control.  I  "discussed using some Neurontin at nighttime, as that is when he experiences the majority of his symptoms.  I explained that he cannot take Neurontin and abuse alcohol.  I have asked him to stop the alcohol consumption entirely if he is going to take any Neurontin.  His appointment with Dr. Whitt, local pain management doctor ended very poorly and Dr. Whitt will not take him as a patient.  We have been working on getting him an appointment with pain management in [place].  He is now tolerating naproxen sodium as long as he avoids the alcohol abuse and takes omeprazole each morning.    He is continuing to pursue an application for disability.    I have him on Lexapro for depression.  He denies uncontrolled depression, SI or SA.  He might benefit from Cymbalta in the future.    His diabetic foot ulcer has resolved. Dr Bradford is his podiatrist.  See prior notes.      Review of Systems   Constitutional: Negative for chills, fatigue and fever.   HENT: Negative for congestion, ear pain, postnasal drip, sinus pressure and sore throat.    Respiratory: Negative for cough, shortness of breath and wheezing.    Cardiovascular: Negative for chest pain, palpitations and leg swelling.   Gastrointestinal: Negative for abdominal pain, blood in stool, constipation, diarrhea, nausea and vomiting.   Endocrine: Negative for cold intolerance, heat intolerance, polydipsia and polyuria.   Genitourinary: Negative for dysuria, frequency, hematuria and urgency.   Skin: Negative for rash.   Neurological: Positive for numbness. Negative for syncope and weakness.   Psychiatric/Behavioral: Positive for agitation and dysphoric mood. The patient is nervous/anxious.        Objective     Ht 175.3 cm (69\") Comment: per patient  Wt 103 kg (226 lb) Comment: per patient  BMI 33.37 kg/m²     Physical Exam    PHQ-2/PHQ-9 Depression Screening 7/1/2019   Little interest or pleasure in doing things 0   Feeling down, depressed, or hopeless 1 "   Trouble falling or staying asleep, or sleeping too much -   Feeling tired or having little energy -   Poor appetite or overeating -   Feeling bad about yourself - or that you are a failure or have let yourself or your family down -   Trouble concentrating on things, such as reading the newspaper or watching television -   Moving or speaking so slowly that other people could have noticed. Or the opposite - being so fidgety or restless that you have been moving around a lot more than usual -   Thoughts that you would be better off dead, or of hurting yourself in some way -   Total Score 1   If you checked off any problems, how difficult have these problems made it for you to do your work, take care of things at home, or get along with other people? -       Assessment/Plan     Continue the current oral agents for type 2 diabetes.  I asked him to check blood sugar twice daily, but he is only checking it once daily, but that still far better than he was doing previously.  I have again instructed him to take Toujeo insulin 8 units on any day that his fasting glucose is greater than 200.  Abstain from alcohol entirely.    I do not want to get this patient on lots of Neurontin due to his long history of alcohol use/abuse.  Due to his symptoms being worse in the evening, I have started Neurontin 600 mg 1/2 to 1 tablet each evening.  I will check a vitamin B12 level with next set of labs.  I instructed him that he must abstain entirely from alcohol, and he agrees to do so.  He can continue to use naproxen sodium as needed, continuing the omeprazole for GI protection.  This also helps other musculoskeletal complaints.    He reports that he is being compliant with his Lexapro.  His depression symptoms do seem to be somewhat better.  He had stopped it for quite a while.  I might consider switching him to Cymbalta in the future to help with chronic pain issues, if needed.    He reports that he is taking his pravastatin now.   His lipids were very poor in the summer, consistent with noncompliance.  We will be repeating labs in a couple months.    I will check a magnesium level with next labs due to his alcohol use/abuse.    Return to clinic in 2 to 3 months with fasting labs prior.    Diagnoses and all orders for this visit:    Type 2 diabetes mellitus with hyperglycemia, with long-term current use of insulin (CMS/[facility])  -     CBC Auto Differential; Future  -     Comprehensive Metabolic Panel; Future  -     Hemoglobin A1c; Future    Type 2 diabetes mellitus with diabetic polyneuropathy, with long-term current use of insulin (CMS/[facility])  -     CBC Auto Differential; Future  -     Comprehensive Metabolic Panel; Future  -     Hemoglobin A1c; Future    Diabetic peripheral neuropathy associated with type 2 diabetes mellitus (CMS/[facility])  -     Vitamin B12; Future  -     Discontinue: gabapentin (NEURONTIN) 600 MG tablet; 1/2-1 tablet each evening for diabetic neuropathy pain  -     gabapentin (NEURONTIN) 600 MG tablet; 1/2-1 tablet each evening for diabetic neuropathy pain    Class 1 obesity due to excess calories with serious comorbidity and body mass index (BMI) of 33.0 to 33.9 in adult    Hyperlipidemia associated with type 2 diabetes mellitus (CMS/[facility])  -     Lipid Panel; Future    Alcohol abuse  -     Comprehensive Metabolic Panel; Future  -     Magnesium; Future        No visits with results within 3 Week(s) from this visit.   Latest known visit with results is:   Lab on 07/16/2020   Component Date Value Ref Range Status   • WBC 07/16/2020 9.97  3.40 - 10.80 10*3/mm3 Final   • RBC 07/16/2020 4.51  4.14 - 5.80 10*6/mm3 Final   • Hemoglobin 07/16/2020 13.0  13.0 - 17.7 g/dL Final   • Hematocrit 07/16/2020 36.2* 37.5 - 51.0 % Final   • MCV 07/16/2020 80.3  79.0 - 97.0 fL Final   • MCH 07/16/2020 28.8  26.6 - 33.0 pg Final   • MCHC 07/16/2020 35.9* 31.5 - 35.7 g/dL Final   • RDW 07/16/2020 12.9  12.3 - 15.4 % Final   • RDW-SD 07/16/2020 36.4* 37.0  - 54.0 fl Final   • MPV 07/16/2020 9.9  6.0 - 12.0 fL Final   • Platelets 07/16/2020 266  140 - 450 10*3/mm3 Final   • Neutrophil % 07/16/2020 75.7  42.7 - 76.0 % Final   • Lymphocyte % 07/16/2020 15.5* 19.6 - 45.3 % Final   • Monocyte % 07/16/2020 5.9  5.0 - 12.0 % Final   • Eosinophil % 07/16/2020 1.8  0.3 - 6.2 % Final   • Basophil % 07/16/2020 1.1  0.0 - 1.5 % Final   • Neutrophils, Absolute 07/16/2020 7.54* 1.70 - 7.00 10*3/mm3 Final   • Lymphocytes, Absolute 07/16/2020 1.55  0.70 - 3.10 10*3/mm3 Final   • Monocytes, Absolute 07/16/2020 0.59  0.10 - 0.90 10*3/mm3 Final   • Eosinophils, Absolute 07/16/2020 0.18  0.00 - 0.40 10*3/mm3 Final   • Basophils, Absolute 07/16/2020 0.11  0.00 - 0.20 10*3/mm3 Final   • Glucose 07/16/2020 609* 70 - 99 mg/dL Final   • BUN 07/16/2020 31* 7 - 23 mg/dL Final   • Creatinine 07/16/2020 1.66* 0.70 - 1.30 mg/dL Final   • Sodium 07/16/2020 129* 137 - 145 mmol/L Final   • Potassium 07/16/2020 4.9  3.4 - 5.0 mmol/L Final   • Chloride 07/16/2020 89* 101 - 112 mmol/L Final   • CO2 07/16/2020 23.0  22.0 - 30.0 mmol/L Final   • Calcium 07/16/2020 10.0  8.4 - 10.2 mg/dL Final   • Total Protein 07/16/2020 7.7  6.3 - 8.6 g/dL Final   • Albumin 07/16/2020 4.30  3.50 - 5.00 g/dL Final   • ALT (SGPT) 07/16/2020 18  <=50 U/L Final   • AST (SGOT) 07/16/2020 19  17 - 59 U/L Final   • Alkaline Phosphatase 07/16/2020 125  38 - 126 U/L Final   • Total Bilirubin 07/16/2020 0.9  0.2 - 1.3 mg/dL Final   • eGFR Non African Amer 07/16/2020 43* 56 - 130 mL/min/1.73 Final   • Globulin 07/16/2020 3.4  2.3 - 3.5 gm/dL Final   • A/G Ratio 07/16/2020 1.3  1.1 - 1.8 g/dL Final   • BUN/Creatinine Ratio 07/16/2020 18.7  7.0 - 25.0 Final   • Anion Gap 07/16/2020 17.0* 5.0 - 15.0 mmol/L Final   • Hemoglobin A1C 07/16/2020 11.28* 4.80 - 5.60 % Final   • Total Cholesterol 07/16/2020 224* 150 - 200 mg/dL Final   • Triglycerides 07/16/2020 372* <=150 mg/dL Final   • HDL Cholesterol 07/16/2020 39* 40 - 59 mg/dL Final    • LDL Cholesterol  07/16/2020 111* <=100 mg/dL Final   • VLDL Cholesterol 07/16/2020 74.4  mg/dL Final   • LDL/HDL Ratio 07/16/2020 2.84  0.00 - 3.55 Final   • PSA 07/16/2020 0.405  0.000 - 4.000 ng/mL Final   • TSH 07/16/2020 2.430  0.270 - 4.200 uIU/mL Final   • C-Peptide 07/16/2020 3.5  1.1 - 4.4 ng/mL Final    C-Peptide reference interval is for fasting patients.   • Microalbumin/Creatinine Ratio 07/16/2020 482.6  mg/g Final   • Creatinine, Urine 07/16/2020 89.1  mg/dL Final   • Microalbumin, Urine 07/16/2020 43.0  mg/dL Final   ]

## 2020-09-10 NOTE — PATIENT INSTRUCTIONS
Diabetes Mellitus and Exercise  Exercising regularly is important for your overall health, especially when you have diabetes (diabetes mellitus). Exercising is not only about losing weight. It has many other health benefits, such as increasing muscle strength and bone density and reducing body fat and stress. This leads to improved fitness, flexibility, and endurance, all of which result in better overall health.  Exercise has additional benefits for people with diabetes, including:  · Reducing appetite.  · Helping to lower and control blood glucose.  · Lowering blood pressure.  · Helping to control amounts of fatty substances (lipids) in the blood, such as cholesterol and triglycerides.  · Helping the body to respond better to insulin (improving insulin sensitivity).  · Reducing how much insulin the body needs.  · Decreasing the risk for heart disease by:  ? Lowering cholesterol and triglyceride levels.  ? Increasing the levels of good cholesterol.  ? Lowering blood glucose levels.  What is my activity plan?  Your health care provider or certified diabetes educator can help you make a plan for the type and frequency of exercise (activity plan) that works for you. Make sure that you:  · Do at least 150 minutes of moderate-intensity or vigorous-intensity exercise each week. This could be brisk walking, biking, or water aerobics.  ? Do stretching and strength exercises, such as yoga or weightlifting, at least 2 times a week.  ? Spread out your activity over at least 3 days of the week.  · Get some form of physical activity every day.  ? Do not go more than 2 days in a row without some kind of physical activity.  ? Avoid being inactive for more than 30 minutes at a time. Take frequent breaks to walk or stretch.  · Choose a type of exercise or activity that you enjoy, and set realistic goals.  · Start slowly, and gradually increase the intensity of your exercise over time.  What do I need to know about managing my  diabetes?    · Check your blood glucose before and after exercising.  ? If your blood glucose is 240 mg/dL (13.3 mmol/L) or higher before you exercise, check your urine for ketones. If you have ketones in your urine, do not exercise until your blood glucose returns to normal.  ? If your blood glucose is 100 mg/dL (5.6 mmol/L) or lower, eat a snack containing 15-20 grams of carbohydrate. Check your blood glucose 15 minutes after the snack to make sure that your level is above 100 mg/dL (5.6 mmol/L) before you start your exercise.  · Know the symptoms of low blood glucose (hypoglycemia) and how to treat it. Your risk for hypoglycemia increases during and after exercise. Common symptoms of hypoglycemia can include:  ? Hunger.  ? Anxiety.  ? Sweating and feeling clammy.  ? Confusion.  ? Dizziness or feeling light-headed.  ? Increased heart rate or palpitations.  ? Blurry vision.  ? Tingling or numbness around the mouth, lips, or tongue.  ? Tremors or shakes.  ? Irritability.  · Keep a rapid-acting carbohydrate snack available before, during, and after exercise to help prevent or treat hypoglycemia.  · Avoid injecting insulin into areas of the body that are going to be exercised. For example, avoid injecting insulin into:  ? The arms, when playing tennis.  ? The legs, when jogging.  · Keep records of your exercise habits. Doing this can help you and your health care provider adjust your diabetes management plan as needed. Write down:  ? Food that you eat before and after you exercise.  ? Blood glucose levels before and after you exercise.  ? The type and amount of exercise you have done.  ? When your insulin is expected to peak, if you use insulin. Avoid exercising at times when your insulin is peaking.  · When you start a new exercise or activity, work with your health care provider to make sure the activity is safe for you, and to adjust your insulin, medicines, or food intake as needed.  · Drink plenty of water while  you exercise to prevent dehydration or heat stroke. Drink enough fluid to keep your urine clear or pale yellow.  Summary  · Exercising regularly is important for your overall health, especially when you have diabetes (diabetes mellitus).  · Exercising has many health benefits, such as increasing muscle strength and bone density and reducing body fat and stress.  · Your health care provider or certified diabetes educator can help you make a plan for the type and frequency of exercise (activity plan) that works for you.  · When you start a new exercise or activity, work with your health care provider to make sure the activity is safe for you, and to adjust your insulin, medicines, or food intake as needed.  This information is not intended to replace advice given to you by your health care provider. Make sure you discuss any questions you have with your health care provider.  Document Released: 03/09/2005 Document Revised: 07/12/2018 Document Reviewed: 05/29/2017  Elsevier Patient Education © 2020 [employer]      Calorie Counting for Weight Loss  Calories are units of energy. Your body needs a certain amount of calories from food to keep you going throughout the day. When you eat more calories than your body needs, your body stores the extra calories as fat. When you eat fewer calories than your body needs, your body burns fat to get the energy it needs.  Calorie counting means keeping track of how many calories you eat and drink each day. Calorie counting can be helpful if you need to lose weight. If you make sure to eat fewer calories than your body needs, you should lose weight. Ask your health care provider what a healthy weight is for you.  For calorie counting to work, you will need to eat the right number of calories in a day in order to lose a healthy amount of weight per week. A dietitian can help you determine how many calories you need in a day and will give you suggestions on how to reach your calorie  goal.  · A healthy amount of weight to lose per week is usually 1-2 lb (0.5-0.9 kg). This usually means that your daily calorie intake should be reduced by 500-750 calories.  · Eating 1,200 - 1,500 calories per day can help most women lose weight.  · Eating 1,500 - 1,800 calories per day can help most men lose weight.  What is my plan?  My goal is to have __________ calories per day.  If I have this many calories per day, I should lose around __________ pounds per week.  What do I need to know about calorie counting?  In order to meet your daily calorie goal, you will need to:  · Find out how many calories are in each food you would like to eat. Try to do this before you eat.  · Decide how much of the food you plan to eat.  · Write down what you ate and how many calories it had. Doing this is called keeping a food log.  To successfully lose weight, it is important to balance calorie counting with a healthy lifestyle that includes regular activity. Aim for 150 minutes of moderate exercise (such as walking) or 75 minutes of vigorous exercise (such as running) each week.  Where do I find calorie information?    The number of calories in a food can be found on a Nutrition Facts label. If a food does not have a Nutrition Facts label, try to look up the calories online or ask your dietitian for help.  Remember that calories are listed per serving. If you choose to have more than one serving of a food, you will have to multiply the calories per serving by the amount of servings you plan to eat. For example, the label on a package of bread might say that a serving size is 1 slice and that there are 90 calories in a serving. If you eat 1 slice, you will have eaten 90 calories. If you eat 2 slices, you will have eaten 180 calories.  How do I keep a food log?  Immediately after each meal, record the following information in your food log:  · What you ate. Don't forget to include toppings, sauces, and other extras on the  "food.  · How much you ate. This can be measured in cups, ounces, or number of items.  · How many calories each food and drink had.  · The total number of calories in the meal.  Keep your food log near you, such as in a small notebook in your pocket, or use a mobile justin or website. Some programs will calculate calories for you and show you how many calories you have left for the day to meet your goal.  What are some calorie counting tips?    · Use your calories on foods and drinks that will fill you up and not leave you hungry:  ? Some examples of foods that fill you up are nuts and nut butters, vegetables, lean proteins, and high-fiber foods like whole grains. High-fiber foods are foods with more than 5 g fiber per serving.  ? Drinks such as sodas, specialty coffee drinks, alcohol, and juices have a lot of calories, yet do not fill you up.  · Eat nutritious foods and avoid empty calories. Empty calories are calories you get from foods or beverages that do not have many vitamins or protein, such as candy, sweets, and soda. It is better to have a nutritious high-calorie food (such as an avocado) than a food with few nutrients (such as a bag of chips).  · Know how many calories are in the foods you eat most often. This will help you calculate calorie counts faster.  · Pay attention to calories in drinks. Low-calorie drinks include water and unsweetened drinks.  · Pay attention to nutrition labels for \"low fat\" or \"fat free\" foods. These foods sometimes have the same amount of calories or more calories than the full fat versions. They also often have added sugar, starch, or salt, to make up for flavor that was removed with the fat.  · Find a way of tracking calories that works for you. Get creative. Try different apps or programs if writing down calories does not work for you.  What are some portion control tips?  · Know how many calories are in a serving. This will help you know how many servings of a certain food you " can have.  · Use a measuring cup to measure serving sizes. You could also try weighing out portions on a kitchen scale. With time, you will be able to estimate serving sizes for some foods.  · Take some time to put servings of different foods on your favorite plates, bowls, and cups so you know what a serving looks like.  · Try not to eat straight from a bag or box. Doing this can lead to overeating. Put the amount you would like to eat in a cup or on a plate to make sure you are eating the right portion.  · Use smaller plates, glasses, and bowls to prevent overeating.  · Try not to multitask (for example, watch TV or use your computer) while eating. If it is time to eat, sit down at a table and enjoy your food. This will help you to know when you are full. It will also help you to be aware of what you are eating and how much you are eating.  What are tips for following this plan?  Reading food labels  · Check the calorie count compared to the serving size. The serving size may be smaller than what you are used to eating.  · Check the source of the calories. Make sure the food you are eating is high in vitamins and protein and low in saturated and trans fats.  Shopping  · Read nutrition labels while you shop. This will help you make healthy decisions before you decide to purchase your food.  · Make a grocery list and stick to it.  Cooking  · Try to cook your favorite foods in a healthier way. For example, try baking instead of frying.  · Use low-fat dairy products.  Meal planning  · Use more fruits and vegetables. Half of your plate should be fruits and vegetables.  · Include lean proteins like poultry and fish.  How do I count calories when eating out?  · Ask for smaller portion sizes.  · Consider sharing an entree and sides instead of getting your own entree.  · If you get your own entree, eat only half. Ask for a box at the beginning of your meal and put the rest of your entree in it so you are not tempted to eat  "it.  · If calories are listed on the menu, choose the lower calorie options.  · Choose dishes that include vegetables, fruits, whole grains, low-fat dairy products, and lean protein.  · Choose items that are boiled, broiled, grilled, or steamed. Stay away from items that are buttered, battered, fried, or served with cream sauce. Items labeled \"crispy\" are usually fried, unless stated otherwise.  · Choose water, low-fat milk, unsweetened iced tea, or other drinks without added sugar. If you want an alcoholic beverage, choose a lower calorie option such as a glass of wine or light beer.  · Ask for dressings, sauces, and syrups on the side. These are usually high in calories, so you should limit the amount you eat.  · If you want a salad, choose a garden salad and ask for grilled meats. Avoid extra toppings like rico, cheese, or fried items. Ask for the dressing on the side, or ask for olive oil and vinegar or lemon to use as dressing.  · Estimate how many servings of a food you are given. For example, a serving of cooked rice is ½ cup or about the size of half a baseball. Knowing serving sizes will help you be aware of how much food you are eating at restaurants. The list below tells you how big or small some common portion sizes are based on everyday objects:  ? 1 oz--4 stacked dice.  ? 3 oz--1 deck of cards.  ? 1 tsp--1 die.  ? 1 Tbsp--½ a ping-pong ball.  ? 2 Tbsp--1 ping-pong ball.  ? ½ cup--½ baseball.  ? 1 cup--1 baseball.  Summary  · Calorie counting means keeping track of how many calories you eat and drink each day. If you eat fewer calories than your body needs, you should lose weight.  · A healthy amount of weight to lose per week is usually 1-2 lb (0.5-0.9 kg). This usually means reducing your daily calorie intake by 500-750 calories.  · The number of calories in a food can be found on a Nutrition Facts label. If a food does not have a Nutrition Facts label, try to look up the calories online or ask your " dietitian for help.  · Use your calories on foods and drinks that will fill you up, and not on foods and drinks that will leave you hungry.  · Use smaller plates, glasses, and bowls to prevent overeating.  This information is not intended to replace advice given to you by your health care provider. Make sure you discuss any questions you have with your health care provider.  Document Released: 12/18/2006 Document Revised: 09/06/2019 Document Reviewed: 11/17/2017  Elsevier Patient Education © 2020 [employer]

## 2020-09-16 RX ORDER — ESCITALOPRAM OXALATE 20 MG/1
20 TABLET ORAL DAILY
Qty: 30 TABLET | Refills: 11 | Status: SHIPPED | OUTPATIENT
Start: 2020-09-16 | End: 2021-05-14 | Stop reason: SDUPTHER

## 2020-09-24 DIAGNOSIS — E11.42 TYPE 2 DIABETES MELLITUS WITH DIABETIC POLYNEUROPATHY, WITHOUT LONG-TERM CURRENT USE OF INSULIN (HCC): Chronic | ICD-10-CM

## 2020-09-25 RX ORDER — PRAVASTATIN SODIUM 80 MG/1
80 TABLET ORAL DAILY
Qty: 90 TABLET | Refills: 3 | Status: SHIPPED | OUTPATIENT
Start: 2020-09-25

## 2020-10-02 DIAGNOSIS — E11.42 DIABETIC PERIPHERAL NEUROPATHY ASSOCIATED WITH TYPE 2 DIABETES MELLITUS (HCC): Chronic | ICD-10-CM

## 2020-10-02 RX ORDER — GABAPENTIN 600 MG/1
TABLET ORAL
Qty: 90 TABLET | Refills: 0 | Status: SHIPPED | OUTPATIENT
Start: 2020-10-02 | End: 2021-02-12 | Stop reason: SDUPTHER

## 2020-10-02 RX ORDER — OMEPRAZOLE 40 MG/1
40 CAPSULE, DELAYED RELEASE ORAL DAILY
Qty: 90 CAPSULE | Refills: 3 | Status: SHIPPED | OUTPATIENT
Start: 2020-10-02 | End: 2021-06-08 | Stop reason: SDUPTHER

## 2020-12-01 ENCOUNTER — LAB (OUTPATIENT)
Dept: LAB | Facility: OTHER | Age: 55
End: 2020-12-01

## 2020-12-01 DIAGNOSIS — Z79.4 TYPE 2 DIABETES MELLITUS WITH HYPERGLYCEMIA, WITH LONG-TERM CURRENT USE OF INSULIN (HCC): Chronic | ICD-10-CM

## 2020-12-01 DIAGNOSIS — E11.42 TYPE 2 DIABETES MELLITUS WITH DIABETIC POLYNEUROPATHY, WITH LONG-TERM CURRENT USE OF INSULIN (HCC): ICD-10-CM

## 2020-12-01 DIAGNOSIS — E78.5 HYPERLIPIDEMIA ASSOCIATED WITH TYPE 2 DIABETES MELLITUS (HCC): Chronic | ICD-10-CM

## 2020-12-01 DIAGNOSIS — Z79.4 TYPE 2 DIABETES MELLITUS WITH DIABETIC POLYNEUROPATHY, WITH LONG-TERM CURRENT USE OF INSULIN (HCC): ICD-10-CM

## 2020-12-01 DIAGNOSIS — E11.65 TYPE 2 DIABETES MELLITUS WITH HYPERGLYCEMIA, WITH LONG-TERM CURRENT USE OF INSULIN (HCC): Chronic | ICD-10-CM

## 2020-12-01 DIAGNOSIS — E11.69 HYPERLIPIDEMIA ASSOCIATED WITH TYPE 2 DIABETES MELLITUS (HCC): Chronic | ICD-10-CM

## 2020-12-01 DIAGNOSIS — E11.42 DIABETIC PERIPHERAL NEUROPATHY ASSOCIATED WITH TYPE 2 DIABETES MELLITUS (HCC): Chronic | ICD-10-CM

## 2020-12-01 DIAGNOSIS — F10.10 ALCOHOL ABUSE: Chronic | ICD-10-CM

## 2020-12-01 LAB
ALBUMIN SERPL-MCNC: 4 G/DL (ref 3.5–5)
ALBUMIN/GLOB SERPL: 1.3 G/DL (ref 1.1–1.8)
ALP SERPL-CCNC: 65 U/L (ref 38–126)
ALT SERPL W P-5'-P-CCNC: 13 U/L
ANION GAP SERPL CALCULATED.3IONS-SCNC: 8 MMOL/L (ref 5–15)
AST SERPL-CCNC: 22 U/L (ref 17–59)
BASOPHILS # BLD AUTO: 0.07 10*3/MM3 (ref 0–0.2)
BASOPHILS NFR BLD AUTO: 1 % (ref 0–1.5)
BILIRUB SERPL-MCNC: 0.9 MG/DL (ref 0.2–1.3)
BUN SERPL-MCNC: 25 MG/DL (ref 7–23)
BUN/CREAT SERPL: 15.9 (ref 7–25)
CALCIUM SPEC-SCNC: 9.5 MG/DL (ref 8.4–10.2)
CHLORIDE SERPL-SCNC: 101 MMOL/L (ref 101–112)
CHOLEST SERPL-MCNC: 132 MG/DL (ref 150–200)
CO2 SERPL-SCNC: 26 MMOL/L (ref 22–30)
CREAT SERPL-MCNC: 1.57 MG/DL (ref 0.7–1.3)
DEPRECATED RDW RBC AUTO: 40.6 FL (ref 37–54)
EOSINOPHIL # BLD AUTO: 0.24 10*3/MM3 (ref 0–0.4)
EOSINOPHIL NFR BLD AUTO: 3.4 % (ref 0.3–6.2)
ERYTHROCYTE [DISTWIDTH] IN BLOOD BY AUTOMATED COUNT: 13.4 % (ref 12.3–15.4)
GFR SERPL CREATININE-BSD FRML MDRD: 46 ML/MIN/1.73 (ref 56–130)
GLOBULIN UR ELPH-MCNC: 3 GM/DL (ref 2.3–3.5)
GLUCOSE SERPL-MCNC: 191 MG/DL (ref 70–99)
HBA1C MFR BLD: 7.39 % (ref 4.8–5.6)
HCT VFR BLD AUTO: 33.3 % (ref 37.5–51)
HDLC SERPL-MCNC: 53 MG/DL (ref 40–59)
HGB BLD-MCNC: 11.2 G/DL (ref 13–17.7)
LDLC SERPL CALC-MCNC: 53 MG/DL
LDLC/HDLC SERPL: 0.92 {RATIO} (ref 0–3.55)
LYMPHOCYTES # BLD AUTO: 1.59 10*3/MM3 (ref 0.7–3.1)
LYMPHOCYTES NFR BLD AUTO: 22.8 % (ref 19.6–45.3)
MAGNESIUM SERPL-MCNC: 1.5 MG/DL (ref 1.6–2.3)
MCH RBC QN AUTO: 28.5 PG (ref 26.6–33)
MCHC RBC AUTO-ENTMCNC: 33.6 G/DL (ref 31.5–35.7)
MCV RBC AUTO: 84.7 FL (ref 79–97)
MONOCYTES # BLD AUTO: 0.58 10*3/MM3 (ref 0.1–0.9)
MONOCYTES NFR BLD AUTO: 8.3 % (ref 5–12)
NEUTROPHILS NFR BLD AUTO: 4.5 10*3/MM3 (ref 1.7–7)
NEUTROPHILS NFR BLD AUTO: 64.5 % (ref 42.7–76)
PLATELET # BLD AUTO: 188 10*3/MM3 (ref 140–450)
PMV BLD AUTO: 9.6 FL (ref 6–12)
POTASSIUM SERPL-SCNC: 4.6 MMOL/L (ref 3.4–5)
PROT SERPL-MCNC: 7 G/DL (ref 6.3–8.6)
RBC # BLD AUTO: 3.93 10*6/MM3 (ref 4.14–5.8)
SODIUM SERPL-SCNC: 135 MMOL/L (ref 137–145)
TRIGL SERPL-MCNC: 152 MG/DL
VIT B12 BLD-MCNC: <150 PG/ML (ref 211–946)
VLDLC SERPL-MCNC: 26 MG/DL (ref 5–40)
WBC # BLD AUTO: 6.98 10*3/MM3 (ref 3.4–10.8)

## 2020-12-01 PROCEDURE — 85025 COMPLETE CBC W/AUTO DIFF WBC: CPT | Performed by: INTERNAL MEDICINE

## 2020-12-01 PROCEDURE — 83036 HEMOGLOBIN GLYCOSYLATED A1C: CPT | Performed by: INTERNAL MEDICINE

## 2020-12-01 PROCEDURE — 82607 VITAMIN B-12: CPT | Performed by: INTERNAL MEDICINE

## 2020-12-01 PROCEDURE — 83735 ASSAY OF MAGNESIUM: CPT | Performed by: INTERNAL MEDICINE

## 2020-12-01 PROCEDURE — 80061 LIPID PANEL: CPT | Performed by: INTERNAL MEDICINE

## 2020-12-01 PROCEDURE — 80053 COMPREHEN METABOLIC PANEL: CPT | Performed by: INTERNAL MEDICINE

## 2020-12-02 NOTE — PROGRESS NOTES
Subjective      You have chosen to receive care through a telephone visit. Do you consent to use a telephone visit for your medical care today? Yes    Total visit time: 24 min    History of Present Illness     Wesly Menezes is a 55 y.o. male who receives care today via telephone visit for overdue 6-month follow up on medical issues include type 2 diabetes, hyperlipidemia, chronic kidney disease, hypertension, severe vitamin B12 deficiency, mild hypomagnesemia, depression/OFELIA, alcohol abuse, and other medical issues.  He has a history of alcohol abuse.  He admits that he has resumed drinking alcohol, but reports much lower amounts.  He is slurring his speech this morning and sounds intoxicated as we talked.  He is continuing to pursue an application for disability.      His A1c is back down to 7.39 after jumping up to 11.2 this summer.  He has been very noncompliant with his medications earlier this year and experienced diabetic complications.  See earlier notes for more details.  In September, I started him on low dose Neurontin for diabetic neuropathy, as I did not want to get this patient on higher doses of Neurontin due to his long history of alcohol use/abuse.  He reports that the Neurontin at night is helpful.  I instructed him that he must abstain entirely from alcohol, and he agreed to do so.      His blood pressure has not been checked lately.  He reports that his weight is stable.    Cholesterol panel is much improved.  LDL improved over 50% with improved compliance with pravastatin.       I checked a magnesium level due to his alcohol use/abuse.  Labs reveal mild hypomagnesemia with magnesium level 1.5.      Labs reveal severe vitamin B-12 deficiency with B-12 <150, undetectable by the chemistry analyzer.       Renal function slightly improved with creatinine 1.57 down from 1.66 in July, but remains significantly higher than baseline 1.19 in January 2020.      The patient's relevant past medical, surgical,  "and social history was reviewed in Epic.   Lab results are reviewed with the patient today.  Fasting glucose 191. A1c 7.39.  Total cholesterol 132.  HDL and LDL 53.  Triglycerides 152 with excellent LDL/HDL ratio 0.92.  Normal liver function.      He reports feeling depressed and sometimes anxious despite Lexapro 20 mg daily.  He reports difficulty staying asleep at night.  He feels that he frequently desires alcohol to help treat his loneliness and depression and anxiety.  He denies SI or SA.  No bipolar cycling.  No auditory or visual hallucinations.  We discussed options.      Review of Systems   Constitutional: Negative for chills, fatigue and fever.   HENT: Negative for congestion, ear pain, postnasal drip, sinus pressure and sore throat.    Respiratory: Negative for cough, shortness of breath and wheezing.    Cardiovascular: Negative for chest pain, palpitations and leg swelling.   Gastrointestinal: Negative for abdominal pain, blood in stool, constipation, diarrhea, nausea and vomiting.   Endocrine: Negative for cold intolerance, heat intolerance, polydipsia and polyuria.   Genitourinary: Negative for dysuria, frequency, hematuria and urgency.   Skin: Negative for rash.   Neurological: Negative for syncope and weakness.   Psychiatric/Behavioral: Positive for dysphoric mood and sleep disturbance. Negative for suicidal ideas. The patient is nervous/anxious.         Objective     Ht 175.3 cm (69\")   Wt 103 kg (226 lb)   BMI 33.37 kg/m²     Physical Exam        Assessment/Plan     Continue the current diabetic management and monitoring.  His control is vastly improved.  Compliance is the key in this patient.  Try to improve dietary management.    Continue the pravastatin and intensify diabetic diet, exercise, and weight loss efforts.    Continue the current lisinopril HCT and metoprolol.  Pursue sodium restriction and weight loss.  Try to monitor blood pressure episodically at home and notify us if not at " goal.    I will continue to allow him to have Neurontin at night, but do not feel that I can add daytime Neurontin at this time due to his alcohol use.    We continue to urge him to completely abstain from alcohol.  Treating his depression more intensely may be helpful.    Start a low-dose of magnesium oxide 250 mg daily and repeat magnesium level in 4 months.  Hypomagnesemia is a new problem.  I checked his level due to the alcohol use/abuse.    The severe vitamin B12 deficiency is a new problem, and particularly significant in this patient with diabetic peripheral neuropathy.  We will have him start oral vitamin B12 1000 mcg daily and come in for vitamin B12 shots weekly for 1 month, then monthly until we recheck his vitamin B12 level in 4 months.    Move the Lexapro 20 mg to morning dosing.  Start Remeron 30 mg nightly.    Continue to avoid NSAIDs and other nephrotoxic drugs.    Continue the omeprazole for GERD/gastritis and try to avoid alcohol entirely.    Return to clinic in 4 months with fasting labs prior.    Diagnoses and all orders for this visit:    Type 2 diabetes mellitus with diabetic polyneuropathy, with long-term current use of insulin (CMS/[facility])  -     CBC Auto Differential; Future  -     Comprehensive Metabolic Panel; Future  -     Hemoglobin A1c; Future    Hyperlipidemia associated with type 2 diabetes mellitus (CMS/HCC)  -     LDL Cholesterol, Direct; Future    Essential hypertension  -     Comprehensive Metabolic Panel; Future    Class 1 obesity due to excess calories with serious comorbidity and body mass index (BMI) of 33.0 to 33.9 in adult    Diabetic peripheral neuropathy associated with type 2 diabetes mellitus (CMS/HCC)    Other dietary vitamin B12 deficiency anemia  -     Vitamin B12; Future    Hypomagnesemia  -     Magnesium; Future    Alcohol abuse    Major depressive disorder without psychotic features    Generalized anxiety disorder    Type 2 diabetes mellitus with stage 3a chronic  kidney disease, with long-term current use of insulin (CMS/[facility])  -     Hemoglobin A1c; Future    Stage 3a chronic kidney disease  -     Comprehensive Metabolic Panel; Future    Other orders  -     mirtazapine (Remeron) 30 MG tablet; Take 1 tablet by mouth Every Night.  -     magnesium oxide 250 MG tablet; Take 1 tablet by mouth Daily.  -     vitamin B-12 (CYANOCOBALAMIN) 1000 MCG tablet; Take 1 tablet by mouth Daily.        Lab on 12/01/2020   Component Date Value Ref Range Status   • WBC 12/01/2020 6.98  3.40 - 10.80 10*3/mm3 Final   • RBC 12/01/2020 3.93* 4.14 - 5.80 10*6/mm3 Final   • Hemoglobin 12/01/2020 11.2* 13.0 - 17.7 g/dL Final   • Hematocrit 12/01/2020 33.3* 37.5 - 51.0 % Final   • MCV 12/01/2020 84.7  79.0 - 97.0 fL Final   • MCH 12/01/2020 28.5  26.6 - 33.0 pg Final   • MCHC 12/01/2020 33.6  31.5 - 35.7 g/dL Final   • RDW 12/01/2020 13.4  12.3 - 15.4 % Final   • RDW-SD 12/01/2020 40.6  37.0 - 54.0 fl Final   • MPV 12/01/2020 9.6  6.0 - 12.0 fL Final   • Platelets 12/01/2020 188  140 - 450 10*3/mm3 Final   • Neutrophil % 12/01/2020 64.5  42.7 - 76.0 % Final   • Lymphocyte % 12/01/2020 22.8  19.6 - 45.3 % Final   • Monocyte % 12/01/2020 8.3  5.0 - 12.0 % Final   • Eosinophil % 12/01/2020 3.4  0.3 - 6.2 % Final   • Basophil % 12/01/2020 1.0  0.0 - 1.5 % Final   • Neutrophils, Absolute 12/01/2020 4.50  1.70 - 7.00 10*3/mm3 Final   • Lymphocytes, Absolute 12/01/2020 1.59  0.70 - 3.10 10*3/mm3 Final   • Monocytes, Absolute 12/01/2020 0.58  0.10 - 0.90 10*3/mm3 Final   • Eosinophils, Absolute 12/01/2020 0.24  0.00 - 0.40 10*3/mm3 Final   • Basophils, Absolute 12/01/2020 0.07  0.00 - 0.20 10*3/mm3 Final   • Glucose 12/01/2020 191* 70 - 99 mg/dL Final   • BUN 12/01/2020 25* 7 - 23 mg/dL Final   • Creatinine 12/01/2020 1.57* 0.70 - 1.30 mg/dL Final   • Sodium 12/01/2020 135* 137 - 145 mmol/L Final   • Potassium 12/01/2020 4.6  3.4 - 5.0 mmol/L Final   • Chloride 12/01/2020 101  101 - 112 mmol/L Final   • CO2  12/01/2020 26.0  22.0 - 30.0 mmol/L Final   • Calcium 12/01/2020 9.5  8.4 - 10.2 mg/dL Final   • Total Protein 12/01/2020 7.0  6.3 - 8.6 g/dL Final   • Albumin 12/01/2020 4.00  3.50 - 5.00 g/dL Final   • ALT (SGPT) 12/01/2020 13  <=50 U/L Final   • AST (SGOT) 12/01/2020 22  17 - 59 U/L Final   • Alkaline Phosphatase 12/01/2020 65  38 - 126 U/L Final   • Total Bilirubin 12/01/2020 0.9  0.2 - 1.3 mg/dL Final   • eGFR Non African Amer 12/01/2020 46* 56 - 130 mL/min/1.73 Final   • Globulin 12/01/2020 3.0  2.3 - 3.5 gm/dL Final   • A/G Ratio 12/01/2020 1.3  1.1 - 1.8 g/dL Final   • BUN/Creatinine Ratio 12/01/2020 15.9  7.0 - 25.0 Final   • Anion Gap 12/01/2020 8.0  5.0 - 15.0 mmol/L Final   • Hemoglobin A1C 12/01/2020 7.39* 4.80 - 5.60 % Final   • Total Cholesterol 12/01/2020 132* 150 - 200 mg/dL Final   • Triglycerides 12/01/2020 152* <=150 mg/dL Final   • HDL Cholesterol 12/01/2020 53  40 - 59 mg/dL Final   • LDL Cholesterol  12/01/2020 53  <=100 mg/dL Final   • VLDL Cholesterol 12/01/2020 26  5 - 40 mg/dL Final   • LDL/HDL Ratio 12/01/2020 0.92  0.00 - 3.55 Final   • Vitamin B-12 12/01/2020 <150* 211 - 946 pg/mL Final   • Magnesium 12/01/2020 1.5* 1.6 - 2.3 mg/dL Final   ]

## 2020-12-03 ENCOUNTER — OFFICE VISIT (OUTPATIENT)
Dept: FAMILY MEDICINE CLINIC | Facility: CLINIC | Age: 55
End: 2020-12-03

## 2020-12-03 ENCOUNTER — CLINICAL SUPPORT (OUTPATIENT)
Dept: FAMILY MEDICINE CLINIC | Facility: CLINIC | Age: 55
End: 2020-12-03

## 2020-12-03 VITALS — BODY MASS INDEX: 33.47 KG/M2 | HEIGHT: 69 IN | WEIGHT: 226 LBS

## 2020-12-03 DIAGNOSIS — E83.42 HYPOMAGNESEMIA: Chronic | ICD-10-CM

## 2020-12-03 DIAGNOSIS — E11.22 TYPE 2 DIABETES MELLITUS WITH STAGE 3A CHRONIC KIDNEY DISEASE, WITH LONG-TERM CURRENT USE OF INSULIN (HCC): Chronic | ICD-10-CM

## 2020-12-03 DIAGNOSIS — I10 ESSENTIAL HYPERTENSION: Chronic | ICD-10-CM

## 2020-12-03 DIAGNOSIS — N18.31 TYPE 2 DIABETES MELLITUS WITH STAGE 3A CHRONIC KIDNEY DISEASE, WITH LONG-TERM CURRENT USE OF INSULIN (HCC): Chronic | ICD-10-CM

## 2020-12-03 DIAGNOSIS — E11.42 DIABETIC PERIPHERAL NEUROPATHY ASSOCIATED WITH TYPE 2 DIABETES MELLITUS (HCC): Chronic | ICD-10-CM

## 2020-12-03 DIAGNOSIS — Z79.4 TYPE 2 DIABETES MELLITUS WITH STAGE 3A CHRONIC KIDNEY DISEASE, WITH LONG-TERM CURRENT USE OF INSULIN (HCC): Chronic | ICD-10-CM

## 2020-12-03 DIAGNOSIS — Z79.4 TYPE 2 DIABETES MELLITUS WITH DIABETIC POLYNEUROPATHY, WITH LONG-TERM CURRENT USE OF INSULIN (HCC): Primary | Chronic | ICD-10-CM

## 2020-12-03 DIAGNOSIS — E11.69 HYPERLIPIDEMIA ASSOCIATED WITH TYPE 2 DIABETES MELLITUS (HCC): Chronic | ICD-10-CM

## 2020-12-03 DIAGNOSIS — E11.42 TYPE 2 DIABETES MELLITUS WITH DIABETIC POLYNEUROPATHY, WITH LONG-TERM CURRENT USE OF INSULIN (HCC): Primary | Chronic | ICD-10-CM

## 2020-12-03 DIAGNOSIS — E66.09 CLASS 1 OBESITY DUE TO EXCESS CALORIES WITH SERIOUS COMORBIDITY AND BODY MASS INDEX (BMI) OF 33.0 TO 33.9 IN ADULT: Chronic | ICD-10-CM

## 2020-12-03 DIAGNOSIS — D51.3 OTHER DIETARY VITAMIN B12 DEFICIENCY ANEMIA: Chronic | ICD-10-CM

## 2020-12-03 DIAGNOSIS — E78.5 HYPERLIPIDEMIA ASSOCIATED WITH TYPE 2 DIABETES MELLITUS (HCC): Chronic | ICD-10-CM

## 2020-12-03 DIAGNOSIS — F32.9 MAJOR DEPRESSIVE DISORDER WITHOUT PSYCHOTIC FEATURES: Chronic | ICD-10-CM

## 2020-12-03 DIAGNOSIS — F41.1 GENERALIZED ANXIETY DISORDER: Chronic | ICD-10-CM

## 2020-12-03 DIAGNOSIS — D51.3 OTHER DIETARY VITAMIN B12 DEFICIENCY ANEMIA: Primary | ICD-10-CM

## 2020-12-03 DIAGNOSIS — F10.10 ALCOHOL ABUSE: Chronic | ICD-10-CM

## 2020-12-03 DIAGNOSIS — N18.31 STAGE 3A CHRONIC KIDNEY DISEASE (HCC): Chronic | ICD-10-CM

## 2020-12-03 PROCEDURE — 99443 PR PHYS/QHP TELEPHONE EVALUATION 21-30 MIN: CPT | Performed by: INTERNAL MEDICINE

## 2020-12-03 PROCEDURE — 96372 THER/PROPH/DIAG INJ SC/IM: CPT | Performed by: INTERNAL MEDICINE

## 2020-12-03 RX ORDER — MIRTAZAPINE 30 MG/1
30 TABLET, FILM COATED ORAL NIGHTLY
Qty: 90 TABLET | Refills: 3 | Status: SHIPPED | OUTPATIENT
Start: 2020-12-03 | End: 2021-05-14 | Stop reason: SDUPTHER

## 2020-12-03 RX ORDER — CYANOCOBALAMIN 1000 UG/ML
1000 INJECTION, SOLUTION INTRAMUSCULAR; SUBCUTANEOUS WEEKLY
Status: SHIPPED | OUTPATIENT
Start: 2020-12-03

## 2020-12-03 RX ORDER — LANOLIN ALCOHOL/MO/W.PET/CERES
1000 CREAM (GRAM) TOPICAL DAILY
Qty: 100 TABLET | Refills: 3 | Status: SHIPPED | OUTPATIENT
Start: 2020-12-03

## 2020-12-03 RX ADMIN — CYANOCOBALAMIN 1000 MCG: 1000 INJECTION, SOLUTION INTRAMUSCULAR; SUBCUTANEOUS at 09:43

## 2020-12-16 DIAGNOSIS — E11.42 TYPE 2 DIABETES MELLITUS WITH DIABETIC POLYNEUROPATHY, WITHOUT LONG-TERM CURRENT USE OF INSULIN (HCC): Chronic | ICD-10-CM

## 2021-01-01 ENCOUNTER — OFFICE VISIT (OUTPATIENT)
Dept: FAMILY MEDICINE CLINIC | Facility: CLINIC | Age: 56
End: 2021-01-01

## 2021-01-01 VITALS — WEIGHT: 200 LBS | BODY MASS INDEX: 29.62 KG/M2 | HEIGHT: 69 IN

## 2021-01-01 DIAGNOSIS — E11.42 DIABETIC PERIPHERAL NEUROPATHY ASSOCIATED WITH TYPE 2 DIABETES MELLITUS (HCC): Chronic | ICD-10-CM

## 2021-01-01 PROCEDURE — 99443 PR PHYS/QHP TELEPHONE EVALUATION 21-30 MIN: CPT | Performed by: INTERNAL MEDICINE

## 2021-01-01 RX ORDER — GABAPENTIN 600 MG/1
TABLET ORAL
Qty: 135 TABLET | Refills: 0 | Status: SHIPPED | OUTPATIENT
Start: 2021-01-01

## 2021-01-01 RX ORDER — ESCITALOPRAM OXALATE 20 MG/1
20 TABLET ORAL EVERY MORNING
Qty: 90 TABLET | Refills: 0 | Status: SHIPPED | OUTPATIENT
Start: 2021-01-01

## 2021-01-18 RX ORDER — METOPROLOL SUCCINATE 25 MG/1
25 TABLET, EXTENDED RELEASE ORAL DAILY
Qty: 90 TABLET | Refills: 3 | Status: SHIPPED | OUTPATIENT
Start: 2021-01-18 | End: 2021-04-12

## 2021-02-12 DIAGNOSIS — E11.42 DIABETIC PERIPHERAL NEUROPATHY ASSOCIATED WITH TYPE 2 DIABETES MELLITUS (HCC): Chronic | ICD-10-CM

## 2021-02-12 RX ORDER — GABAPENTIN 600 MG/1
TABLET ORAL
Qty: 90 TABLET | Refills: 0 | Status: SHIPPED | OUTPATIENT
Start: 2021-02-12 | End: 2021-02-19 | Stop reason: SDUPTHER

## 2021-02-19 ENCOUNTER — OFFICE VISIT (OUTPATIENT)
Dept: FAMILY MEDICINE CLINIC | Facility: CLINIC | Age: 56
End: 2021-02-19

## 2021-02-19 VITALS — WEIGHT: 226 LBS | HEIGHT: 69 IN | BODY MASS INDEX: 33.47 KG/M2

## 2021-02-19 DIAGNOSIS — Z79.4 TYPE 2 DIABETES MELLITUS WITH HYPERGLYCEMIA, WITH LONG-TERM CURRENT USE OF INSULIN (HCC): ICD-10-CM

## 2021-02-19 DIAGNOSIS — E11.42 DIABETIC PERIPHERAL NEUROPATHY ASSOCIATED WITH TYPE 2 DIABETES MELLITUS (HCC): Chronic | ICD-10-CM

## 2021-02-19 DIAGNOSIS — K29.00 ACUTE GASTRITIS WITHOUT HEMORRHAGE, UNSPECIFIED GASTRITIS TYPE: Primary | ICD-10-CM

## 2021-02-19 DIAGNOSIS — E83.42 HYPOMAGNESEMIA: Chronic | ICD-10-CM

## 2021-02-19 DIAGNOSIS — E11.65 TYPE 2 DIABETES MELLITUS WITH HYPERGLYCEMIA, WITH LONG-TERM CURRENT USE OF INSULIN (HCC): ICD-10-CM

## 2021-02-19 DIAGNOSIS — K85.90 ACUTE PANCREATITIS WITHOUT INFECTION OR NECROSIS, UNSPECIFIED PANCREATITIS TYPE: ICD-10-CM

## 2021-02-19 PROCEDURE — 99443 PR PHYS/QHP TELEPHONE EVALUATION 21-30 MIN: CPT | Performed by: INTERNAL MEDICINE

## 2021-02-19 RX ORDER — GABAPENTIN 600 MG/1
TABLET ORAL
Qty: 90 TABLET | Refills: 0 | Status: SHIPPED | OUTPATIENT
Start: 2021-02-19 | End: 2021-06-18 | Stop reason: SDUPTHER

## 2021-02-19 RX ORDER — ONDANSETRON 4 MG/1
4 TABLET, ORALLY DISINTEGRATING ORAL
COMMUNITY
Start: 2021-02-12

## 2021-02-19 RX ORDER — GLIMEPIRIDE 4 MG/1
4 TABLET ORAL 2 TIMES DAILY
Qty: 180 TABLET | Refills: 3 | Status: SHIPPED | OUTPATIENT
Start: 2021-02-19 | End: 2021-04-12 | Stop reason: ALTCHOICE

## 2021-02-19 NOTE — PATIENT INSTRUCTIONS

## 2021-02-19 NOTE — PROGRESS NOTES
"You have chosen to receive care through a telephone visit. Do you consent to use a telephone visit for your medical care today? Yes    Total visit time: 31 minutes.     Chief Complaint  Follow-up (post hospital 02/10-02/12 [facility] for pancreatitis. States he feels better) and Numbness (hands and feet)    Subjective          History of Present Illness     Wesly Menezes receives care today via telephone visit through [facility] for hospital follow up.  Patient was admitted to the ER at [facility] with intractable nausea, vomiting, and  diarrhea.  He was admitted to [facility] 02/10/2020 and continued on IV fluids and Zofran.  He had elevated lipase, although, no pancreas inflammation findings on CT.  His glucose was initially elevated 506, for which he responded to insulin and IV fluids.  He had not been able to tolerate the oral medications on the day prior to hospitalization due to GI symptoms.  He did not try increasing his insulin.  By the following morning after admission,  his nausea had improved and he was able to start tolerating clear liquids. His lipase also normalized and symptoms were felt to be related food poisoning and dehydration.  He had eaten some pizza from [email or web address] and almost immediately had onset of symptoms.  He was discharged home on 02/12/2021.  He had mild diarrhea this morning, but overall feels symptoms have mostly resolved.  Diabetes management improved since he has resumed all diabetic medications.     Patient struggles with diabetic neuropathy, for which he is tolerating Neurontin well, but had ran out of the prescription.  He was given 3 tablets of gabapentin at hospital discharge to get him through until he could follow up here for refill.  He denies significant side effects including excessive daytime sedation.       He has hypomagnesemia, for which he needs a refill.           Objective   Vital Signs:   Ht 175.3 cm (69\")   Wt 103 kg (226 lb)   BMI 33.37 kg/m²   "     Physical Exam   Result Review :     CMP    CMP 7/16/20 12/1/20   Glucose 609 (A) 191 (A)   BUN 31 (A) 25 (A)   Creatinine 1.66 (A) 1.57 (A)   eGFR Non  Am 43 (A) 46 (A)   Sodium 129 (A) 135 (A)   Potassium 4.9 4.6   Chloride 89 (A) 101   Calcium 10.0 9.5   Albumin 4.30 4.00   Total Bilirubin 0.9 0.9   Alkaline Phosphatase 125 65   AST (SGOT) 19 22   ALT (SGPT) 18 13   (A) Abnormal value            CBC w/diff    CBC w/Diff 7/16/20 12/1/20   WBC 9.97 6.98   RBC 4.51 3.93 (A)   Hemoglobin 13.0 11.2 (A)   Hematocrit 36.2 (A) 33.3 (A)   MCV 80.3 84.7   MCH 28.8 28.5   MCHC 35.9 (A) 33.6   RDW 12.9 13.4   Platelets 266 188   Neutrophil Rel % 75.7 64.5   Lymphocyte Rel % 15.5 (A) 22.8   Monocyte Rel % 5.9 8.3   Eosinophil Rel % 1.8 3.4   Basophil Rel % 1.1 1.0   (A) Abnormal value            Renal Profile    Renal Profile 7/16/20 12/1/20   BUN 31 (A) 25 (A)   Creatinine 1.66 (A) 1.57 (A)   eGFR Non  Am 43 (A) 46 (A)   (A) Abnormal value            Data reviewed: Recent hospitalization notes MD notes           Future Appointments   Date Time Provider Department Center   4/12/2021  8:30 AM Jono Cadena MD [facility]     Assessment and Plan    There are no diagnoses linked to this encounter.       Current outpatient and discharge medications have been reconciled for the patient.  Patient feels much better.  Encouraged patient to gradually resume normal diet.  Stay hydrated.     Continue current diabetic medications.  Reviewed sick day principles to help manage diabetes if unable to tolerate oral medications.  He could dose extra units of the Toujeo and monitor more frequently if this should occur again in the future.      Refill is sent for Neurontin.  Patient understands the risks associated with this controlled medication, including tolerance and addiction.  He also agrees to only obtain this medication from me, and not from a another provider, unless that provider is covering for me in my absence.   He also agrees to be compliant in dosing, and not self adjust the dose of medication.  A signed controlled substance agreement and education sheet are mailed to patient.  JOHNNY was obtained.    Refill is sent for magnesium 250 mg to take one q.d. for hypomagnesemia. Magnesium was 1.7 on hospital discharge.      Continue other medications and vitamin and mineral supplements to treat additional medical problems which we addressed today.      Return in April for routine follow up with fasting labs one week prior or sooner if needed.       Scribed for Dr. Cadena by Yoko Johnson [facility].           Follow Up   No follow-ups on file.  Patient was given instructions and counseling regarding his condition or for health maintenance advice. Please see specific information pulled into the AVS if appropriate.

## 2021-03-16 ENCOUNTER — OFFICE VISIT (OUTPATIENT)
Dept: FAMILY MEDICINE CLINIC | Facility: CLINIC | Age: 56
End: 2021-03-16

## 2021-03-16 VITALS — HEIGHT: 69 IN | WEIGHT: 190 LBS | BODY MASS INDEX: 28.14 KG/M2

## 2021-03-16 DIAGNOSIS — E11.42 TYPE 2 DIABETES MELLITUS WITH DIABETIC POLYNEUROPATHY, WITH LONG-TERM CURRENT USE OF INSULIN (HCC): Chronic | ICD-10-CM

## 2021-03-16 DIAGNOSIS — Z79.4 TYPE 2 DIABETES MELLITUS WITH DIABETIC POLYNEUROPATHY, WITH LONG-TERM CURRENT USE OF INSULIN (HCC): Chronic | ICD-10-CM

## 2021-03-16 DIAGNOSIS — E11.42 DIABETIC PERIPHERAL NEUROPATHY ASSOCIATED WITH TYPE 2 DIABETES MELLITUS (HCC): Chronic | ICD-10-CM

## 2021-03-16 DIAGNOSIS — F32.9 MAJOR DEPRESSIVE DISORDER WITHOUT PSYCHOTIC FEATURES: Chronic | ICD-10-CM

## 2021-03-16 DIAGNOSIS — D51.3 OTHER DIETARY VITAMIN B12 DEFICIENCY ANEMIA: Chronic | ICD-10-CM

## 2021-03-16 DIAGNOSIS — R29.898 RIGHT HAND WEAKNESS: Primary | ICD-10-CM

## 2021-03-16 DIAGNOSIS — K85.90 ACUTE PANCREATITIS WITHOUT INFECTION OR NECROSIS, UNSPECIFIED PANCREATITIS TYPE: ICD-10-CM

## 2021-03-16 DIAGNOSIS — E83.42 HYPOMAGNESEMIA: ICD-10-CM

## 2021-03-16 DIAGNOSIS — N18.31 STAGE 3A CHRONIC KIDNEY DISEASE (HCC): Chronic | ICD-10-CM

## 2021-03-16 DIAGNOSIS — Z91.199 NONCOMPLIANCE WITH DIABETES TREATMENT: ICD-10-CM

## 2021-03-16 DIAGNOSIS — R29.6 FREQUENT FALLS: ICD-10-CM

## 2021-03-16 PROBLEM — E11.10 DIABETIC KETOACIDOSIS WITHOUT COMA ASSOCIATED WITH TYPE 2 DIABETES MELLITUS (HCC): Status: ACTIVE | Noted: 2020-07-18

## 2021-03-16 PROBLEM — S91.102A OPEN WOUND OF LEFT GREAT TOE: Status: ACTIVE | Noted: 2020-07-18

## 2021-03-16 PROBLEM — E78.2 MIXED HYPERLIPIDEMIA: Status: ACTIVE | Noted: 2020-05-28

## 2021-03-16 PROCEDURE — 99443 PR PHYS/QHP TELEPHONE EVALUATION 21-30 MIN: CPT | Performed by: INTERNAL MEDICINE

## 2021-03-16 NOTE — PROGRESS NOTES
"You have chosen to receive care through a telephone visit. Do you consent to use a telephone visit for your medical care today? Yes   Total visit time: 31 minutes.     Chief Complaint  Balance Issues, Extremity Weakness (right hand since hospitalization in Feb. ), and Fall (uses a cane and states his legs get weak and he falls. He states has fallen several times )    Subjective          History of Present Illness     Wesly Menezes receives care via telephone visit through [facility] reporting onset of some new symptoms occurring since hospital discharge 02/12/2020.  Patient reports weakness and instability bilateral lower extremities increasing his fall risk resulting in several recent falls last week, but denies falls this week.  He had his first appointment for disability evaluation last week.  He has a history of alcohol abuse, but states he has not consumed any alcohol in several months. He reports difficulty walking back from the mailbox this morning due to the lower extremity weakness despite ambulating with a cane.        He also reports weakness of the right hand with numbness of the index finger and thumb of right hand. He has difficulty straightening out the hand.  Denies wrist pain.      Review of labs 12/2020 reveals B-12 level <150.   We administered some vitamin B12 shots late last year, but, to my knowledge, he has not had any B12 shots in the past 3 months.    He reports compliance with diabetic medications, although, has not been monitoring glucose at all lately.  He is vague about how compliant he has been with insulin.  We reinforced importance of monitoring glucose routinely to manage diabetes.  We restarted Neurontin last month to help manage diabetic neuropathy. He reports he is taking the Neurontin 600 mg q.h.s. as prescribed.        Objective    Vital Signs:   Ht 175.3 cm (69\")   Wt 86.2 kg (190 lb)   BMI 28.06 kg/m²       Physical Exam   Result Review : "     CMP    CMP 7/16/20 12/1/20   Glucose 609 (A) 191 (A)   BUN 31 (A) 25 (A)   Creatinine 1.66 (A) 1.57 (A)   eGFR Non  Am 43 (A) 46 (A)   Sodium 129 (A) 135 (A)   Potassium 4.9 4.6   Chloride 89 (A) 101   Calcium 10.0 9.5   Albumin 4.30 4.00   Total Bilirubin 0.9 0.9   Alkaline Phosphatase 125 65   AST (SGOT) 19 22   ALT (SGPT) 18 13   (A) Abnormal value            CBC w/diff    CBC w/Diff 7/16/20 12/1/20   WBC 9.97 6.98   RBC 4.51 3.93 (A)   Hemoglobin 13.0 11.2 (A)   Hematocrit 36.2 (A) 33.3 (A)   MCV 80.3 84.7   MCH 28.8 28.5   MCHC 35.9 (A) 33.6   RDW 12.9 13.4   Platelets 266 188   Neutrophil Rel % 75.7 64.5   Lymphocyte Rel % 15.5 (A) 22.8   Monocyte Rel % 5.9 8.3   Eosinophil Rel % 1.8 3.4   Basophil Rel % 1.1 1.0   (A) Abnormal value            Renal Profile    Renal Profile 7/16/20 12/1/20   BUN 31 (A) 25 (A)   Creatinine 1.66 (A) 1.57 (A)   eGFR Non  Am 43 (A) 46 (A)   (A) Abnormal value            A1C Last 3 Results    HGBA1C Last 3 Results 7/16/20 7/19/20 12/1/20   Hemoglobin A1C 11.28 (A) 10.9 (A) 7.39 (A)   (A) Abnormal value            Data reviewed: Recent hospitalization notes Hospital discharge summary           Future Appointments   Date Time Provider Department Center   3/24/2021  8:00 AM Jono Cadena MD [facility]   4/12/2021  8:30 AM Jono Cadena MD SG  [facility]       Assessment and Plan    Diagnoses and all orders for this visit:    1. Right hand weakness (Primary)  -     CT Head Without Contrast; Future    2. Frequent falls  -     CT Head Without Contrast; Future    3. Type 2 diabetes mellitus with diabetic polyneuropathy, with long-term current use of insulin (CMS/[facility])    4. Hypomagnesemia    5. Diabetic peripheral neuropathy associated with type 2 diabetes mellitus (CMS/HCC)    6. Acute pancreatitis without infection or necrosis, unspecified pancreatitis type    7. Noncompliance with diabetes treatment    8. Stage 3a chronic kidney disease (CMS/HCC)    9. Other  dietary vitamin B12 deficiency anemia -severe    10. Major depressive disorder without psychotic features           Continue current diabetic medications, both oral and insulin.  Monitor glucose twice daily, keeping a diary to bring in for office visit next week.     Review of his records reveal vitamin severe B-12 deficiency with B-12 <150 12/2020.  I placed orders for patient to come into the clinic tomorrow morning for labs including CBC, CMP, A1c, B-12, magnesium and LDL cholesterol.       We called patient and asked him to stop by the office for B-12 injection in the morning when he comes into the clinic, but after having blood drawn for his labs. I placed an order for patient to schedule CT of the head without contrast due to the weakness of the right hand and lower extremities..      We will see patient in 8 days for office visit to further evaluate above mentioned issues including the lower extremity weakness.  Patient is advised to seek immediate medical attention in the ER if weakness suddenly worsens or patient has any other symptoms of TIA or stroke.    Continue the Lexapro for depression.  He did not seem to be at his full cognitive baseline today.    Continue to avoid NSAIDs and other nephrotoxic drugs.    Scribed for Dr. Cadena by Yoko Johnson [facility].     Follow Up   Return in about 8 days (around 3/24/2021) for Next scheduled follow up - labs 1 week prior.     Patient was given instructions and counseling regarding his condition or for health maintenance advice. Please see specific information pulled into the AVS if appropriate.

## 2021-03-17 ENCOUNTER — LAB (OUTPATIENT)
Dept: LAB | Facility: OTHER | Age: 56
End: 2021-03-17

## 2021-03-17 ENCOUNTER — CLINICAL SUPPORT (OUTPATIENT)
Dept: FAMILY MEDICINE CLINIC | Facility: CLINIC | Age: 56
End: 2021-03-17

## 2021-03-17 DIAGNOSIS — N18.31 STAGE 3A CHRONIC KIDNEY DISEASE (HCC): Chronic | ICD-10-CM

## 2021-03-17 DIAGNOSIS — E78.5 HYPERLIPIDEMIA ASSOCIATED WITH TYPE 2 DIABETES MELLITUS (HCC): Chronic | ICD-10-CM

## 2021-03-17 DIAGNOSIS — N18.31 TYPE 2 DIABETES MELLITUS WITH STAGE 3A CHRONIC KIDNEY DISEASE, WITH LONG-TERM CURRENT USE OF INSULIN (HCC): Chronic | ICD-10-CM

## 2021-03-17 DIAGNOSIS — I10 ESSENTIAL HYPERTENSION: Chronic | ICD-10-CM

## 2021-03-17 DIAGNOSIS — D51.3 OTHER DIETARY VITAMIN B12 DEFICIENCY ANEMIA: Chronic | ICD-10-CM

## 2021-03-17 DIAGNOSIS — E11.22 TYPE 2 DIABETES MELLITUS WITH STAGE 3A CHRONIC KIDNEY DISEASE, WITH LONG-TERM CURRENT USE OF INSULIN (HCC): Chronic | ICD-10-CM

## 2021-03-17 DIAGNOSIS — E11.42 TYPE 2 DIABETES MELLITUS WITH DIABETIC POLYNEUROPATHY, WITH LONG-TERM CURRENT USE OF INSULIN (HCC): Chronic | ICD-10-CM

## 2021-03-17 DIAGNOSIS — Z79.4 TYPE 2 DIABETES MELLITUS WITH DIABETIC POLYNEUROPATHY, WITH LONG-TERM CURRENT USE OF INSULIN (HCC): Chronic | ICD-10-CM

## 2021-03-17 DIAGNOSIS — E83.42 HYPOMAGNESEMIA: Chronic | ICD-10-CM

## 2021-03-17 DIAGNOSIS — E11.69 HYPERLIPIDEMIA ASSOCIATED WITH TYPE 2 DIABETES MELLITUS (HCC): Chronic | ICD-10-CM

## 2021-03-17 DIAGNOSIS — Z79.4 TYPE 2 DIABETES MELLITUS WITH STAGE 3A CHRONIC KIDNEY DISEASE, WITH LONG-TERM CURRENT USE OF INSULIN (HCC): Chronic | ICD-10-CM

## 2021-03-17 LAB
ALBUMIN SERPL-MCNC: 3.9 G/DL (ref 3.5–5)
ALBUMIN/GLOB SERPL: 1.4 G/DL (ref 1.1–1.8)
ALP SERPL-CCNC: 83 U/L (ref 38–126)
ALT SERPL W P-5'-P-CCNC: 14 U/L
ANION GAP SERPL CALCULATED.3IONS-SCNC: 7 MMOL/L (ref 5–15)
ARTICHOKE IGE QN: 112 MG/DL (ref 0–100)
AST SERPL-CCNC: 25 U/L (ref 17–59)
BASOPHILS # BLD AUTO: 0.18 10*3/MM3 (ref 0–0.2)
BASOPHILS NFR BLD AUTO: 1.9 % (ref 0–1.5)
BILIRUB SERPL-MCNC: 0.6 MG/DL (ref 0.2–1.3)
BUN SERPL-MCNC: 19 MG/DL (ref 7–23)
BUN/CREAT SERPL: 15 (ref 7–25)
CALCIUM SPEC-SCNC: 9.5 MG/DL (ref 8.4–10.2)
CHLORIDE SERPL-SCNC: 102 MMOL/L (ref 101–112)
CO2 SERPL-SCNC: 27 MMOL/L (ref 22–30)
CREAT SERPL-MCNC: 1.27 MG/DL (ref 0.7–1.3)
DEPRECATED RDW RBC AUTO: 37.2 FL (ref 37–54)
EOSINOPHIL # BLD AUTO: 0.38 10*3/MM3 (ref 0–0.4)
EOSINOPHIL NFR BLD AUTO: 4 % (ref 0.3–6.2)
ERYTHROCYTE [DISTWIDTH] IN BLOOD BY AUTOMATED COUNT: 12.4 % (ref 12.3–15.4)
GFR SERPL CREATININE-BSD FRML MDRD: 58 ML/MIN/1.73 (ref 56–130)
GLOBULIN UR ELPH-MCNC: 2.7 GM/DL (ref 2.3–3.5)
GLUCOSE SERPL-MCNC: 313 MG/DL (ref 70–99)
HBA1C MFR BLD: 8.04 % (ref 4.8–5.6)
HCT VFR BLD AUTO: 35.5 % (ref 37.5–51)
HGB BLD-MCNC: 12.3 G/DL (ref 13–17.7)
LYMPHOCYTES # BLD AUTO: 2.06 10*3/MM3 (ref 0.7–3.1)
LYMPHOCYTES NFR BLD AUTO: 21.7 % (ref 19.6–45.3)
MAGNESIUM SERPL-MCNC: 1.3 MG/DL (ref 1.6–2.3)
MCH RBC QN AUTO: 29.5 PG (ref 26.6–33)
MCHC RBC AUTO-ENTMCNC: 34.6 G/DL (ref 31.5–35.7)
MCV RBC AUTO: 85.1 FL (ref 79–97)
MONOCYTES # BLD AUTO: 0.72 10*3/MM3 (ref 0.1–0.9)
MONOCYTES NFR BLD AUTO: 7.6 % (ref 5–12)
NEUTROPHILS NFR BLD AUTO: 6.17 10*3/MM3 (ref 1.7–7)
NEUTROPHILS NFR BLD AUTO: 64.8 % (ref 42.7–76)
PLATELET # BLD AUTO: 219 10*3/MM3 (ref 140–450)
PMV BLD AUTO: 10 FL (ref 6–12)
POTASSIUM SERPL-SCNC: 5.1 MMOL/L (ref 3.4–5)
PROT SERPL-MCNC: 6.6 G/DL (ref 6.3–8.6)
RBC # BLD AUTO: 4.17 10*6/MM3 (ref 4.14–5.8)
SODIUM SERPL-SCNC: 136 MMOL/L (ref 137–145)
VIT B12 BLD-MCNC: 283 PG/ML (ref 211–946)
WBC # BLD AUTO: 9.51 10*3/MM3 (ref 3.4–10.8)

## 2021-03-17 PROCEDURE — 96372 THER/PROPH/DIAG INJ SC/IM: CPT | Performed by: INTERNAL MEDICINE

## 2021-03-17 PROCEDURE — 83036 HEMOGLOBIN GLYCOSYLATED A1C: CPT | Performed by: INTERNAL MEDICINE

## 2021-03-17 PROCEDURE — 80053 COMPREHEN METABOLIC PANEL: CPT | Performed by: INTERNAL MEDICINE

## 2021-03-17 PROCEDURE — 83721 ASSAY OF BLOOD LIPOPROTEIN: CPT | Performed by: INTERNAL MEDICINE

## 2021-03-17 PROCEDURE — 82607 VITAMIN B-12: CPT | Performed by: INTERNAL MEDICINE

## 2021-03-17 PROCEDURE — 83735 ASSAY OF MAGNESIUM: CPT | Performed by: INTERNAL MEDICINE

## 2021-03-17 PROCEDURE — 85025 COMPLETE CBC W/AUTO DIFF WBC: CPT | Performed by: INTERNAL MEDICINE

## 2021-03-17 RX ADMIN — CYANOCOBALAMIN 1000 MCG: 1000 INJECTION, SOLUTION INTRAMUSCULAR; SUBCUTANEOUS at 07:49

## 2021-03-22 DIAGNOSIS — R29.6 FREQUENT FALLS: ICD-10-CM

## 2021-03-22 DIAGNOSIS — R29.898 RIGHT HAND WEAKNESS: Primary | ICD-10-CM

## 2021-03-23 DIAGNOSIS — R29.6 FREQUENT FALLS: ICD-10-CM

## 2021-03-23 DIAGNOSIS — R29.898 RIGHT HAND WEAKNESS: Primary | ICD-10-CM

## 2021-03-24 ENCOUNTER — OFFICE VISIT (OUTPATIENT)
Dept: FAMILY MEDICINE CLINIC | Facility: CLINIC | Age: 56
End: 2021-03-24

## 2021-03-24 VITALS
SYSTOLIC BLOOD PRESSURE: 136 MMHG | TEMPERATURE: 97 F | BODY MASS INDEX: 29.77 KG/M2 | WEIGHT: 201 LBS | HEART RATE: 80 BPM | DIASTOLIC BLOOD PRESSURE: 70 MMHG | HEIGHT: 69 IN

## 2021-03-24 DIAGNOSIS — R29.898 RIGHT HAND WEAKNESS: ICD-10-CM

## 2021-03-24 DIAGNOSIS — E83.42 HYPOMAGNESEMIA: Chronic | ICD-10-CM

## 2021-03-24 DIAGNOSIS — Z79.4 TYPE 2 DIABETES MELLITUS WITH HYPERGLYCEMIA, WITH LONG-TERM CURRENT USE OF INSULIN (HCC): ICD-10-CM

## 2021-03-24 DIAGNOSIS — R29.898 WEAKNESS OF BOTH LOWER EXTREMITIES: ICD-10-CM

## 2021-03-24 DIAGNOSIS — E11.65 TYPE 2 DIABETES MELLITUS WITH HYPERGLYCEMIA, WITH LONG-TERM CURRENT USE OF INSULIN (HCC): ICD-10-CM

## 2021-03-24 DIAGNOSIS — M54.12 CERVICAL RADICULOPATHY: Primary | ICD-10-CM

## 2021-03-24 DIAGNOSIS — D51.3 OTHER DIETARY VITAMIN B12 DEFICIENCY ANEMIA: ICD-10-CM

## 2021-03-24 DIAGNOSIS — M47.12 OSTEOARTHRITIS OF CERVICAL SPINE WITH MYELOPATHY: ICD-10-CM

## 2021-03-24 DIAGNOSIS — I72.5 ANEURYSM OF BASILAR ARTERY (HCC): ICD-10-CM

## 2021-03-24 DIAGNOSIS — R29.6 FREQUENT FALLS: ICD-10-CM

## 2021-03-24 PROBLEM — E11.3299 MILD NONPROLIFERATIVE DIABETIC RETINOPATHY WITHOUT MACULAR EDEMA ASSOCIATED WITH TYPE 2 DIABETES MELLITUS (HCC): Chronic | Status: ACTIVE | Noted: 2021-03-24

## 2021-03-24 PROCEDURE — 99215 OFFICE O/P EST HI 40 MIN: CPT | Performed by: INTERNAL MEDICINE

## 2021-03-24 PROCEDURE — 96372 THER/PROPH/DIAG INJ SC/IM: CPT | Performed by: INTERNAL MEDICINE

## 2021-03-24 RX ADMIN — CYANOCOBALAMIN 1000 MCG: 1000 INJECTION, SOLUTION INTRAMUSCULAR; SUBCUTANEOUS at 08:50
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
## 2021-03-26 DIAGNOSIS — R29.898 WEAKNESS OF BOTH LOWER EXTREMITIES: ICD-10-CM

## 2021-03-26 DIAGNOSIS — M47.12 OSTEOARTHRITIS OF CERVICAL SPINE WITH MYELOPATHY: ICD-10-CM

## 2021-03-26 DIAGNOSIS — M54.12 CERVICAL RADICULOPATHY: ICD-10-CM

## 2021-04-12 ENCOUNTER — OFFICE VISIT (OUTPATIENT)
Dept: FAMILY MEDICINE CLINIC | Facility: CLINIC | Age: 56
End: 2021-04-12

## 2021-04-12 VITALS — HEIGHT: 69 IN | WEIGHT: 201 LBS | BODY MASS INDEX: 29.77 KG/M2

## 2021-04-12 DIAGNOSIS — R29.898 WEAKNESS OF BOTH LOWER EXTREMITIES: ICD-10-CM

## 2021-04-12 DIAGNOSIS — Z28.311 NEED FOR SECOND DOSE OF COVID-19 VACCINE: ICD-10-CM

## 2021-04-12 DIAGNOSIS — E11.42 TYPE 2 DIABETES MELLITUS WITH DIABETIC POLYNEUROPATHY, WITH LONG-TERM CURRENT USE OF INSULIN (HCC): Chronic | ICD-10-CM

## 2021-04-12 DIAGNOSIS — Z23 NEED FOR SECOND DOSE OF COVID-19 VACCINE: ICD-10-CM

## 2021-04-12 DIAGNOSIS — R29.898 RIGHT HAND WEAKNESS: ICD-10-CM

## 2021-04-12 DIAGNOSIS — Z79.4 TYPE 2 DIABETES MELLITUS WITH DIABETIC POLYNEUROPATHY, WITH LONG-TERM CURRENT USE OF INSULIN (HCC): Chronic | ICD-10-CM

## 2021-04-12 DIAGNOSIS — M54.12 CERVICAL RADICULOPATHY: ICD-10-CM

## 2021-04-12 DIAGNOSIS — Z09 HOSPITAL DISCHARGE FOLLOW-UP: Primary | ICD-10-CM

## 2021-04-12 DIAGNOSIS — Z91.199 NONCOMPLIANCE WITH DIABETES TREATMENT: ICD-10-CM

## 2021-04-12 DIAGNOSIS — I10 ESSENTIAL HYPERTENSION: Chronic | ICD-10-CM

## 2021-04-12 PROBLEM — G95.9 CERVICAL MYELOPATHY (HCC): Status: ACTIVE | Noted: 2021-03-26

## 2021-04-12 PROCEDURE — 99443 PR PHYS/QHP TELEPHONE EVALUATION 21-30 MIN: CPT | Performed by: INTERNAL MEDICINE

## 2021-04-12 RX ORDER — INSULIN LISPRO 100 [IU]/ML
9 INJECTION, SOLUTION INTRAVENOUS; SUBCUTANEOUS 3 TIMES DAILY
COMMUNITY
Start: 2021-04-05 | End: 2021-06-07

## 2021-04-12 RX ORDER — DAPAGLIFLOZIN 5 MG/1
1 TABLET, FILM COATED ORAL DAILY
COMMUNITY
Start: 2021-04-05

## 2021-04-12 RX ORDER — TIZANIDINE 4 MG/1
4 TABLET ORAL EVERY 6 HOURS PRN
COMMUNITY
Start: 2021-04-05 | End: 2021-04-16

## 2021-04-12 RX ORDER — SEMAGLUTIDE 1.34 MG/ML
INJECTION, SOLUTION SUBCUTANEOUS
COMMUNITY
Start: 2021-04-08 | End: 2021-05-30

## 2021-04-12 RX ORDER — LOSARTAN POTASSIUM 50 MG/1
50 TABLET ORAL 2 TIMES DAILY
COMMUNITY
Start: 2021-04-05 | End: 2021-07-05

## 2021-04-12 RX ORDER — HYDROCODONE BITARTRATE AND ACETAMINOPHEN 7.5; 325 MG/1; MG/1
1 TABLET ORAL EVERY 4 HOURS PRN
COMMUNITY
Start: 2021-04-05 | End: 2021-04-16

## 2021-04-12 NOTE — PROGRESS NOTES
You have chosen to receive care through a telephone visit. Do you consent to use a telephone visit for your medical care today? Yes    Total visit time: 35 minutes.      Chief Complaint  Follow-up (post hospital and rehab 03/26-03/30 with cervical spine surgery 03/29 by Dr. Hassan. Rehab 03/30-04-05. )    Subjective          History of Present Illness      Wesly Menezes is our 55-year-old patient who was admitted to [facility] on 3/26/2021 with cervical myelopathy and underwent C5-6 ACDF with Dr. Hassan 3/29/2021 for treatment of large central disc herniation C5-6 resulting severe central canal stenosis and myelopathy.after experiencing hand paresthesias, right hand spasm, and RUE and BLE weakness resulting in frequent falls. He was transferred to rehab for OT and PT services on 03/30/2021 due to his impaired functional mobility and to complete self care following hospitalization for cervical myelopathy. Outpatient physical therapy service established through outpatient therapy.  He was discharged with Norco 7.5/325 mg for pain and Zanaflex for muscle spasm.  He reports remarkable improvement overall wtih the strength in his legs and right arm gradually returning and he is now able to stand alone and ambulate.  He feels that there has been a large improvement.    Dr. Valerio, endocrinology, was consulted for management of uncontrolled DM and Wesly was instructed to continue Toujeo at 40 units daily and Humalog 9 units with meals. His Amaryl and Januvia were stopped and he was started on Farxiga 5 mg and weekly Ozempic injections.   He is scheduled to see Dr. Valerio 05/06/2021 for follow up on diabetes.  Patient reports glucose has continued to run far above goal.  Fasting glucose was 300 this a.m.  However, in discussion, he has not been taking the basal insulin at all, as he was confused about the dose instructions.  Discharge instructions was Toujeo 40 units daily.    Dr. Valerio also increased losartan  "50 mg to twice daily, but he was on lisinopril HCT prior to admission.  For some reason during his hospitalization, that was changed to losartan.         Objective   Vital Signs:     Ht 175.3 cm (69\")   Wt 91.2 kg (201 lb)   BMI 29.68 kg/m²       Physical Exam   Result Review :     CMP    CMP 7/16/20 12/1/20 3/17/21   Glucose 609 (A) 191 (A) 313 (A)   BUN 31 (A) 25 (A) 19   Creatinine 1.66 (A) 1.57 (A) 1.27   eGFR Non  Am 43 (A) 46 (A) 58   Sodium 129 (A) 135 (A) 136 (A)   Potassium 4.9 4.6 5.1 (A)   Chloride 89 (A) 101 102   Calcium 10.0 9.5 9.5   Albumin 4.30 4.00 3.90   Total Bilirubin 0.9 0.9 0.6   Alkaline Phosphatase 125 65 83   AST (SGOT) 19 22 25   ALT (SGPT) 18 13 14   (A) Abnormal value            CBC w/diff    CBC w/Diff 7/16/20 12/1/20 3/17/21   WBC 9.97 6.98 9.51   RBC 4.51 3.93 (A) 4.17   Hemoglobin 13.0 11.2 (A) 12.3 (A)   Hematocrit 36.2 (A) 33.3 (A) 35.5 (A)   MCV 80.3 84.7 85.1   MCH 28.8 28.5 29.5   MCHC 35.9 (A) 33.6 34.6   RDW 12.9 13.4 12.4   Platelets 266 188 219   Neutrophil Rel % 75.7 64.5 64.8   Lymphocyte Rel % 15.5 (A) 22.8 21.7   Monocyte Rel % 5.9 8.3 7.6   Eosinophil Rel % 1.8 3.4 4.0   Basophil Rel % 1.1 1.0 1.9 (A)   (A) Abnormal value            A1C Last 3 Results    HGBA1C Last 3 Results 3/17/21 3/27/21 4/1/21   Hemoglobin A1C 8.04 (A) 9.1 (A) 8.2 (A)   (A) Abnormal value            Data reviewed: Recent hospitalization notes [facility] 03/26/21-03/30/21 Rehab 03/30/2021-04/05/2021         Future Appointments   Date Time Provider Department Center   4/26/2021  9:30 AM Jono Cadena MD [facility]        Assessment and Plan    Diagnoses and all orders for this visit:    1. Hospital discharge follow-up (Primary)    2. Type 2 diabetes mellitus with diabetic polyneuropathy, with long-term current use of insulin (CMS/[facility])    3. Cervical radiculopathy    4. Weakness of both lower extremities    5. Right hand weakness    6. Noncompliance with diabetes " treatment    7. Essential hypertension    8. Need for second dose of COVID-19 vaccine           Patient reports remarkable improvement since surgery and is very pleased with the improvement.   Continue the PT and OT and follow-up with neurosurgery as they recommend.    Patient has not been taking the long-acting Toujeo insulin since discharge.  Noncompliance has been a frequent problem and reaching his diabetic goals since he started seeing me a couple years ago.  I encouraged him to call me or Dr. Valerio's office whenever he has questions such as the ones he had regarding Toujeo dosing.  I recommended he gradually work up to the 40 unit daily Toujeo dose, taking 20 units of Toujeo today increasing to 30 units tomorrow if glucose remains consisently above goal and further increasing up to the 40 unit dose, if needed.  Continue the mealtime insulin 9 units with meals.  Continue Metformin, weekly Ozempic and daily Farxiga 5 mg daily started on discharge.  Monitor glucose and notify Dr. Valerio or myself if glucose remains consistently above goal once he adds the long acting insulin.  Keep appointment with Dr. Valerio on 05/06/2021.    Continue the losartan 50 mg b.i.d.  Monitor BP and notify me if not consistently at goal.       Return later this month (04/26/2021) for routine follow up with fasting labs one week prior or sooner if needed.      Scribed for Dr. Cadena by Yoko Johnson [facility].     Current outpatient and discharge medications have been reconciled for the patient.  Reviewed by: Jono Cadena MD      Follow Up   Return for Next scheduled follow up - no labs anticipated.  Patient was given instructions and counseling regarding his condition or for health maintenance advice. Please see specific information pulled into the AVS if appropriate.

## 2021-04-27 ENCOUNTER — LAB (OUTPATIENT)
Dept: LAB | Facility: OTHER | Age: 56
End: 2021-04-27

## 2021-04-27 DIAGNOSIS — D51.3 OTHER DIETARY VITAMIN B12 DEFICIENCY ANEMIA: ICD-10-CM

## 2021-04-27 DIAGNOSIS — E83.42 HYPOMAGNESEMIA: Chronic | ICD-10-CM

## 2021-04-27 DIAGNOSIS — E11.65 TYPE 2 DIABETES MELLITUS WITH HYPERGLYCEMIA, WITH LONG-TERM CURRENT USE OF INSULIN (HCC): ICD-10-CM

## 2021-04-27 DIAGNOSIS — Z79.4 TYPE 2 DIABETES MELLITUS WITH HYPERGLYCEMIA, WITH LONG-TERM CURRENT USE OF INSULIN (HCC): ICD-10-CM

## 2021-04-27 LAB
ALBUMIN SERPL-MCNC: 3.5 G/DL (ref 3.5–5)
ALBUMIN/GLOB SERPL: 1.3 G/DL (ref 1.1–1.8)
ALP SERPL-CCNC: 88 U/L (ref 38–126)
ALT SERPL W P-5'-P-CCNC: 11 U/L
ANION GAP SERPL CALCULATED.3IONS-SCNC: 5 MMOL/L (ref 5–15)
AST SERPL-CCNC: 26 U/L (ref 17–59)
BASOPHILS # BLD MANUAL: 0.07 10*3/MM3 (ref 0–0.2)
BASOPHILS NFR BLD AUTO: 1 % (ref 0–1.5)
BILIRUB SERPL-MCNC: 0.4 MG/DL (ref 0.2–1.3)
BUN SERPL-MCNC: 19 MG/DL (ref 7–23)
BUN/CREAT SERPL: 14.2 (ref 7–25)
CALCIUM SPEC-SCNC: 8.8 MG/DL (ref 8.4–10.2)
CHLORIDE SERPL-SCNC: 104 MMOL/L (ref 101–112)
CO2 SERPL-SCNC: 30 MMOL/L (ref 22–30)
CREAT SERPL-MCNC: 1.34 MG/DL (ref 0.7–1.3)
DEPRECATED RDW RBC AUTO: 38.6 FL (ref 37–54)
EOSINOPHIL # BLD MANUAL: 0.33 10*3/MM3 (ref 0–0.4)
EOSINOPHIL NFR BLD MANUAL: 5 % (ref 0.3–6.2)
ERYTHROCYTE [DISTWIDTH] IN BLOOD BY AUTOMATED COUNT: 12.7 % (ref 12.3–15.4)
GFR SERPL CREATININE-BSD FRML MDRD: 55 ML/MIN/1.73 (ref 56–130)
GLOBULIN UR ELPH-MCNC: 2.8 GM/DL (ref 2.3–3.5)
GLUCOSE SERPL-MCNC: 110 MG/DL (ref 70–99)
HCT VFR BLD AUTO: 35.7 % (ref 37.5–51)
HGB BLD-MCNC: 12 G/DL (ref 13–17.7)
LYMPHOCYTES # BLD MANUAL: 1.84 10*3/MM3 (ref 0.7–3.1)
LYMPHOCYTES NFR BLD MANUAL: 28 % (ref 19.6–45.3)
LYMPHOCYTES NFR BLD MANUAL: 7 % (ref 5–12)
MAGNESIUM SERPL-MCNC: 2 MG/DL (ref 1.6–2.3)
MCH RBC QN AUTO: 28.7 PG (ref 26.6–33)
MCHC RBC AUTO-ENTMCNC: 33.6 G/DL (ref 31.5–35.7)
MCV RBC AUTO: 85.4 FL (ref 79–97)
MONOCYTES # BLD AUTO: 0.46 10*3/MM3 (ref 0.1–0.9)
NEUTROPHILS # BLD AUTO: 3.88 10*3/MM3 (ref 1.7–7)
NEUTROPHILS NFR BLD MANUAL: 59 % (ref 42.7–76)
PLATELET # BLD AUTO: 210 10*3/MM3 (ref 140–450)
PMV BLD AUTO: 9.5 FL (ref 6–12)
POTASSIUM SERPL-SCNC: 4.3 MMOL/L (ref 3.4–5)
PROT SERPL-MCNC: 6.3 G/DL (ref 6.3–8.6)
RBC # BLD AUTO: 4.18 10*6/MM3 (ref 4.14–5.8)
RBC MORPH BLD: NORMAL
SMALL PLATELETS BLD QL SMEAR: ADEQUATE
SODIUM SERPL-SCNC: 139 MMOL/L (ref 137–145)
VIT B12 BLD-MCNC: 722 PG/ML (ref 211–946)
WBC # BLD AUTO: 6.58 10*3/MM3 (ref 3.4–10.8)
WBC MORPH BLD: NORMAL

## 2021-04-27 PROCEDURE — 83735 ASSAY OF MAGNESIUM: CPT | Performed by: INTERNAL MEDICINE

## 2021-04-27 PROCEDURE — 80053 COMPREHEN METABOLIC PANEL: CPT | Performed by: INTERNAL MEDICINE

## 2021-04-27 PROCEDURE — 82607 VITAMIN B-12: CPT | Performed by: INTERNAL MEDICINE

## 2021-04-27 PROCEDURE — 85025 COMPLETE CBC W/AUTO DIFF WBC: CPT | Performed by: INTERNAL MEDICINE

## 2021-04-30 ENCOUNTER — OFFICE VISIT (OUTPATIENT)
Dept: FAMILY MEDICINE CLINIC | Facility: CLINIC | Age: 56
End: 2021-04-30

## 2021-04-30 VITALS
SYSTOLIC BLOOD PRESSURE: 146 MMHG | DIASTOLIC BLOOD PRESSURE: 80 MMHG | WEIGHT: 195.8 LBS | BODY MASS INDEX: 29 KG/M2 | TEMPERATURE: 97 F | HEART RATE: 84 BPM | HEIGHT: 69 IN

## 2021-04-30 DIAGNOSIS — I10 ESSENTIAL HYPERTENSION: Primary | Chronic | ICD-10-CM

## 2021-04-30 DIAGNOSIS — E11.69 HYPERLIPIDEMIA ASSOCIATED WITH TYPE 2 DIABETES MELLITUS (HCC): Chronic | ICD-10-CM

## 2021-04-30 DIAGNOSIS — E11.42 DIABETIC PERIPHERAL NEUROPATHY ASSOCIATED WITH TYPE 2 DIABETES MELLITUS (HCC): Chronic | ICD-10-CM

## 2021-04-30 DIAGNOSIS — E11.22 TYPE 2 DIABETES MELLITUS WITH STAGE 3A CHRONIC KIDNEY DISEASE, WITH LONG-TERM CURRENT USE OF INSULIN (HCC): Chronic | ICD-10-CM

## 2021-04-30 DIAGNOSIS — G95.9 CERVICAL MYELOPATHY WITH CERVICAL RADICULOPATHY (HCC): ICD-10-CM

## 2021-04-30 DIAGNOSIS — E11.3293 MILD NONPROLIFERATIVE DIABETIC RETINOPATHY OF BOTH EYES WITHOUT MACULAR EDEMA ASSOCIATED WITH TYPE 2 DIABETES MELLITUS (HCC): Chronic | ICD-10-CM

## 2021-04-30 DIAGNOSIS — E78.5 HYPERLIPIDEMIA ASSOCIATED WITH TYPE 2 DIABETES MELLITUS (HCC): Chronic | ICD-10-CM

## 2021-04-30 DIAGNOSIS — E83.42 HYPOMAGNESEMIA: ICD-10-CM

## 2021-04-30 DIAGNOSIS — D51.3 OTHER DIETARY VITAMIN B12 DEFICIENCY ANEMIA: Chronic | ICD-10-CM

## 2021-04-30 DIAGNOSIS — N18.31 TYPE 2 DIABETES MELLITUS WITH STAGE 3A CHRONIC KIDNEY DISEASE, WITH LONG-TERM CURRENT USE OF INSULIN (HCC): Chronic | ICD-10-CM

## 2021-04-30 DIAGNOSIS — M54.12 CERVICAL MYELOPATHY WITH CERVICAL RADICULOPATHY (HCC): ICD-10-CM

## 2021-04-30 DIAGNOSIS — N18.31 STAGE 3A CHRONIC KIDNEY DISEASE (HCC): Chronic | ICD-10-CM

## 2021-04-30 DIAGNOSIS — Z12.5 SPECIAL SCREENING FOR MALIGNANT NEOPLASM OF PROSTATE: ICD-10-CM

## 2021-04-30 DIAGNOSIS — R29.898 WEAKNESS OF BOTH LOWER EXTREMITIES: ICD-10-CM

## 2021-04-30 DIAGNOSIS — Z79.4 TYPE 2 DIABETES MELLITUS WITH STAGE 3A CHRONIC KIDNEY DISEASE, WITH LONG-TERM CURRENT USE OF INSULIN (HCC): Chronic | ICD-10-CM

## 2021-04-30 DIAGNOSIS — M54.12 CERVICAL RADICULOPATHY: ICD-10-CM

## 2021-04-30 PROBLEM — E66.09 CLASS 1 OBESITY DUE TO EXCESS CALORIES WITH SERIOUS COMORBIDITY AND BODY MASS INDEX (BMI) OF 33.0 TO 33.9 IN ADULT: Chronic | Status: RESOLVED | Noted: 2018-08-24 | Resolved: 2021-04-30

## 2021-04-30 PROCEDURE — 99214 OFFICE O/P EST MOD 30 MIN: CPT | Performed by: INTERNAL MEDICINE

## 2021-04-30 RX ORDER — HYDROCODONE BITARTRATE AND ACETAMINOPHEN 7.5; 325 MG/1; MG/1
TABLET ORAL
COMMUNITY
Start: 2021-04-19

## 2021-04-30 RX ORDER — INSULIN GLARGINE 300 U/ML
40 INJECTION, SOLUTION SUBCUTANEOUS EVERY EVENING
Qty: 4 PEN | Refills: 11 | Status: SHIPPED | OUTPATIENT
Start: 2021-04-30

## 2021-04-30 RX ORDER — INSULIN LISPRO 100 [IU]/ML
9 INJECTION, SOLUTION INTRAVENOUS; SUBCUTANEOUS 3 TIMES DAILY
Status: CANCELLED | OUTPATIENT
Start: 2021-04-30

## 2021-04-30 RX ORDER — METOPROLOL SUCCINATE 25 MG/1
25 TABLET, EXTENDED RELEASE ORAL DAILY
COMMUNITY
Start: 2021-04-19 | End: 2021-05-14 | Stop reason: SDUPTHER

## 2021-04-30 NOTE — PATIENT INSTRUCTIONS
Exercising to Lose Weight  Exercise is structured, repetitive physical activity to improve fitness and health. Getting regular exercise is important for everyone. It is especially important if you are overweight. Being overweight increases your risk of heart disease, stroke, diabetes, high blood pressure, and several types of cancer. Reducing your calorie intake and exercising can help you lose weight.  Exercise is usually categorized as moderate or vigorous intensity. To lose weight, most people need to do a certain amount of moderate-intensity or vigorous-intensity exercise each week.  Moderate-intensity exercise    Moderate-intensity exercise is any activity that gets you moving enough to burn at least three times more energy (calories) than if you were sitting.  Examples of moderate exercise include:  · Walking a mile in 15 minutes.  · Doing light yard work.  · Biking at an easy pace.  Most people should get at least 150 minutes (2 hours and 30 minutes) a week of moderate-intensity exercise to maintain their body weight.  Vigorous-intensity exercise  Vigorous-intensity exercise is any activity that gets you moving enough to burn at least six times more calories than if you were sitting. When you exercise at this intensity, you should be working hard enough that you are not able to carry on a conversation.  Examples of vigorous exercise include:  · Running.  · Playing a team sport, such as football, basketball, and soccer.  · Jumping rope.  Most people should get at least 75 minutes (1 hour and 15 minutes) a week of vigorous-intensity exercise to maintain their body weight.  How can exercise affect me?  When you exercise enough to burn more calories than you eat, you lose weight. Exercise also reduces body fat and builds muscle. The more muscle you have, the more calories you burn. Exercise also:  · Improves mood.  · Reduces stress and tension.  · Improves your overall fitness, flexibility, and  endurance.  · Increases bone strength.  The amount of exercise you need to lose weight depends on:  · Your age.  · The type of exercise.  · Any health conditions you have.  · Your overall physical ability.  Talk to your health care provider about how much exercise you need and what types of activities are safe for you.  What actions can I take to lose weight?  Nutrition    · Make changes to your diet as told by your health care provider or diet and nutrition specialist (dietitian). This may include:  ? Eating fewer calories.  ? Eating more protein.  ? Eating less unhealthy fats.  ? Eating a diet that includes fresh fruits and vegetables, whole grains, low-fat dairy products, and lean protein.  ? Avoiding foods with added fat, salt, and sugar.  · Drink plenty of water while you exercise to prevent dehydration or heat stroke.  Activity  · Choose an activity that you enjoy and set realistic goals. Your health care provider can help you make an exercise plan that works for you.  · Exercise at a moderate or vigorous intensity most days of the week.  ? The intensity of exercise may vary from person to person. You can tell how intense a workout is for you by paying attention to your breathing and heartbeat. Most people will notice their breathing and heartbeat get faster with more intense exercise.  · Do resistance training twice each week, such as:  ? Push-ups.  ? Sit-ups.  ? Lifting weights.  ? Using resistance bands.  · Getting short amounts of exercise can be just as helpful as long structured periods of exercise. If you have trouble finding time to exercise, try to include exercise in your daily routine.  ? Get up, stretch, and walk around every 30 minutes throughout the day.  ? Go for a walk during your lunch break.  ? Park your car farther away from your destination.  ? If you take public transportation, get off one stop early and walk the rest of the way.  ? Make phone calls while standing up and walking  around.  ? Take the stairs instead of elevators or escalators.  · Wear comfortable clothes and shoes with good support.  · Do not exercise so much that you hurt yourself, feel dizzy, or get very short of breath.  Where to find more information  · [place] Department of Health and Human Services: www.hhs.gov  · Centers for Disease Control and Prevention (CDC): www.cdc.gov  Contact a health care provider:  · Before starting a new exercise program.  · If you have questions or concerns about your weight.  · If you have a medical problem that keeps you from exercising.  Get help right away if you have any of the following while exercising:  · Injury.  · Dizziness.  · Difficulty breathing or shortness of breath that does not go away when you stop exercising.  · Chest pain.  · Rapid heartbeat.  Summary  · Being overweight increases your risk of heart disease, stroke, diabetes, high blood pressure, and several types of cancer.  · Losing weight happens when you burn more calories than you eat.  · Reducing the amount of calories you eat in addition to getting regular moderate or vigorous exercise each week helps you lose weight.  This information is not intended to replace advice given to you by your health care provider. Make sure you discuss any questions you have with your health care provider.  Document Revised: 12/31/2018 Document Reviewed: 12/31/2018  [employer] Patient Education © 2021 [employer]      Calorie Counting for Weight Loss  Calories are units of energy. Your body needs a certain amount of calories from food to keep you going throughout the day. When you eat more calories than your body needs, your body stores the extra calories as fat. When you eat fewer calories than your body needs, your body burns fat to get the energy it needs.  Calorie counting means keeping track of how many calories you eat and drink each day. Calorie counting can be helpful if you need to lose weight. If you make sure to eat fewer  calories than your body needs, you should lose weight. Ask your health care provider what a healthy weight is for you.  For calorie counting to work, you will need to eat the right number of calories in a day in order to lose a healthy amount of weight per week. A dietitian can help you determine how many calories you need in a day and will give you suggestions on how to reach your calorie goal.  · A healthy amount of weight to lose per week is usually 1-2 lb (0.5-0.9 kg). This usually means that your daily calorie intake should be reduced by 500-750 calories.  · Eating 1,200 - 1,500 calories per day can help most women lose weight.  · Eating 1,500 - 1,800 calories per day can help most men lose weight.  What is my plan?  My goal is to have __________ calories per day.  If I have this many calories per day, I should lose around __________ pounds per week.  What do I need to know about calorie counting?  In order to meet your daily calorie goal, you will need to:  · Find out how many calories are in each food you would like to eat. Try to do this before you eat.  · Decide how much of the food you plan to eat.  · Write down what you ate and how many calories it had. Doing this is called keeping a food log.  To successfully lose weight, it is important to balance calorie counting with a healthy lifestyle that includes regular activity. Aim for 150 minutes of moderate exercise (such as walking) or 75 minutes of vigorous exercise (such as running) each week.  Where do I find calorie information?    The number of calories in a food can be found on a Nutrition Facts label. If a food does not have a Nutrition Facts label, try to look up the calories online or ask your dietitian for help.  Remember that calories are listed per serving. If you choose to have more than one serving of a food, you will have to multiply the calories per serving by the amount of servings you plan to eat. For example, the label on a package of  "bread might say that a serving size is 1 slice and that there are 90 calories in a serving. If you eat 1 slice, you will have eaten 90 calories. If you eat 2 slices, you will have eaten 180 calories.  How do I keep a food log?  Immediately after each meal, record the following information in your food log:  · What you ate. Don't forget to include toppings, sauces, and other extras on the food.  · How much you ate. This can be measured in cups, ounces, or number of items.  · How many calories each food and drink had.  · The total number of calories in the meal.  Keep your food log near you, such as in a small notebook in your pocket, or use a mobile justin or website. Some programs will calculate calories for you and show you how many calories you have left for the day to meet your goal.  What are some calorie counting tips?    · Use your calories on foods and drinks that will fill you up and not leave you hungry:  ? Some examples of foods that fill you up are nuts and nut butters, vegetables, lean proteins, and high-fiber foods like whole grains. High-fiber foods are foods with more than 5 g fiber per serving.  ? Drinks such as sodas, specialty coffee drinks, alcohol, and juices have a lot of calories, yet do not fill you up.  · Eat nutritious foods and avoid empty calories. Empty calories are calories you get from foods or beverages that do not have many vitamins or protein, such as candy, sweets, and soda. It is better to have a nutritious high-calorie food (such as an avocado) than a food with few nutrients (such as a bag of chips).  · Know how many calories are in the foods you eat most often. This will help you calculate calorie counts faster.  · Pay attention to calories in drinks. Low-calorie drinks include water and unsweetened drinks.  · Pay attention to nutrition labels for \"low fat\" or \"fat free\" foods. These foods sometimes have the same amount of calories or more calories than the full fat versions. They " also often have added sugar, starch, or salt, to make up for flavor that was removed with the fat.  · Find a way of tracking calories that works for you. Get creative. Try different apps or programs if writing down calories does not work for you.  What are some portion control tips?  · Know how many calories are in a serving. This will help you know how many servings of a certain food you can have.  · Use a measuring cup to measure serving sizes. You could also try weighing out portions on a kitchen scale. With time, you will be able to estimate serving sizes for some foods.  · Take some time to put servings of different foods on your favorite plates, bowls, and cups so you know what a serving looks like.  · Try not to eat straight from a bag or box. Doing this can lead to overeating. Put the amount you would like to eat in a cup or on a plate to make sure you are eating the right portion.  · Use smaller plates, glasses, and bowls to prevent overeating.  · Try not to multitask (for example, watch TV or use your computer) while eating. If it is time to eat, sit down at a table and enjoy your food. This will help you to know when you are full. It will also help you to be aware of what you are eating and how much you are eating.  What are tips for following this plan?  Reading food labels  · Check the calorie count compared to the serving size. The serving size may be smaller than what you are used to eating.  · Check the source of the calories. Make sure the food you are eating is high in vitamins and protein and low in saturated and trans fats.  Shopping  · Read nutrition labels while you shop. This will help you make healthy decisions before you decide to purchase your food.  · Make a grocery list and stick to it.  Cooking  · Try to cook your favorite foods in a healthier way. For example, try baking instead of frying.  · Use low-fat dairy products.  Meal planning  · Use more fruits and vegetables. Half of your  "plate should be fruits and vegetables.  · Include lean proteins like poultry and fish.  How do I count calories when eating out?  · Ask for smaller portion sizes.  · Consider sharing an entree and sides instead of getting your own entree.  · If you get your own entree, eat only half. Ask for a box at the beginning of your meal and put the rest of your entree in it so you are not tempted to eat it.  · If calories are listed on the menu, choose the lower calorie options.  · Choose dishes that include vegetables, fruits, whole grains, low-fat dairy products, and lean protein.  · Choose items that are boiled, broiled, grilled, or steamed. Stay away from items that are buttered, battered, fried, or served with cream sauce. Items labeled \"crispy\" are usually fried, unless stated otherwise.  · Choose water, low-fat milk, unsweetened iced tea, or other drinks without added sugar. If you want an alcoholic beverage, choose a lower calorie option such as a glass of wine or light beer.  · Ask for dressings, sauces, and syrups on the side. These are usually high in calories, so you should limit the amount you eat.  · If you want a salad, choose a garden salad and ask for grilled meats. Avoid extra toppings like rico, cheese, or fried items. Ask for the dressing on the side, or ask for olive oil and vinegar or lemon to use as dressing.  · Estimate how many servings of a food you are given. For example, a serving of cooked rice is ½ cup or about the size of half a baseball. Knowing serving sizes will help you be aware of how much food you are eating at restaurants. The list below tells you how big or small some common portion sizes are based on everyday objects:  ? 1 oz--4 stacked dice.  ? 3 oz--1 deck of cards.  ? 1 tsp--1 die.  ? 1 Tbsp--½ a ping-pong ball.  ? 2 Tbsp--1 ping-pong ball.  ? ½ cup--½ baseball.  ? 1 cup--1 baseball.  Summary  · Calorie counting means keeping track of how many calories you eat and drink each day. If " you eat fewer calories than your body needs, you should lose weight.  · A healthy amount of weight to lose per week is usually 1-2 lb (0.5-0.9 kg). This usually means reducing your daily calorie intake by 500-750 calories.  · The number of calories in a food can be found on a Nutrition Facts label. If a food does not have a Nutrition Facts label, try to look up the calories online or ask your dietitian for help.  · Use your calories on foods and drinks that will fill you up, and not on foods and drinks that will leave you hungry.  · Use smaller plates, glasses, and bowls to prevent overeating.  This information is not intended to replace advice given to you by your health care provider. Make sure you discuss any questions you have with your health care provider.  Document Revised: 09/06/2019 Document Reviewed: 11/17/2017  [employer] Patient Education © 2020 [employer]

## 2021-04-30 NOTE — PROGRESS NOTES
Chief Complaint  Follow-up (6 month and lab results)    Subjective          History of Present Illness     Wesly Menezes presents to the clinic for overdue 6-month follow up on medical issues include type 2 diabetes, hyperlipidemia, hypertension, depression/OFELIA, and other medical issues.  Patient recently underwent C5-6 ACDF with Dr. Hassan 3/29/2021 for treatment of large central disc herniation C5-6 resulting severe central canal stenosis and myelopathy after experiencing hand paresthesias, right hand spasm, RUE and BLE weakness resulting in frequent falls.  He has had remarkable improvement and continues to gradually improve with outpatient physical therapy.   strength has normalized and patient is walking with only mild instability.      With recent hospitalization, Dr. Valerio, endocrinology, was consulted for management of uncontrolled DM.  Noncompliance has been a frequent problem in getting patient to diabetic goals since he started seeing me approximately two years ago. However, he states glucose has been improving with daily monitoring. With his follow up visit a couple of weeks ago, he reported he had not been taking the long-acting Toujeo insulin since discharge, for which I asked him to resume and recommended he gradually work up to the 40 unit daily Toujeo dose and continue the mealtime insulin 9 units with meals in addition to the weekly Ozempic and daily Farxiga 5 mg daily started on discharge.  He has a scheduled appointment with Dr. Valerio on 05/06/2021.  He reports Dr. Valerio also discontinued his metformin with hospital discharge.       Blood pressure above goal this morning at 146/80, although, patient reports he has not taken his losartan    Weight is down 41 pounds in the past 5 months.      Patient had negative Cologaurd testing 02/2020     The patient's relevant past medical, surgical, and social history was reviewed in Epic.   Lab results are reviewed with the patient today.  CBC  "unremarkable.  Normal liver function.  Vitamin B-12 and magnesium at goal.  Renal function mildly above goal with creatinine 1.3.     Objective   Vital Signs:   /80 Comment: hasnt had meds yet  Pulse 84   Temp 97 °F (36.1 °C) (Infrared)   Ht 175.3 cm (69\")   Wt 88.8 kg (195 lb 12.8 oz)   BMI 28.91 kg/m²       Physical Exam  Vitals reviewed.   Constitutional:       General: He is not in acute distress.     Appearance: He is well-developed.      Comments: Pleasant male, overweight.     HENT:      Head: Normocephalic and atraumatic.      Nose:      Right Sinus: No maxillary sinus tenderness or frontal sinus tenderness.      Left Sinus: No maxillary sinus tenderness or frontal sinus tenderness.      Mouth/Throat:      Mouth: No oral lesions.      Pharynx: Uvula midline.      Tonsils: No tonsillar exudate.   Eyes:      Conjunctiva/sclera: Conjunctivae normal.      Pupils: Pupils are equal, round, and reactive to light.   Neck:      Thyroid: No thyroid mass or thyromegaly.      Vascular: No carotid bruit or JVD.      Trachea: Trachea normal. No tracheal deviation.   Cardiovascular:      Rate and Rhythm: Normal rate and regular rhythm.  No extrasystoles are present.     Chest Wall: PMI is not displaced.      Heart sounds: Normal heart sounds. No murmur heard.     Pulmonary:      Effort: Pulmonary effort is normal. No accessory muscle usage or respiratory distress.      Breath sounds: Normal breath sounds. No decreased breath sounds, wheezing, rhonchi or rales.   Abdominal:      General: Bowel sounds are normal. There is no distension.      Palpations: Abdomen is soft.      Tenderness: There is no abdominal tenderness.   Musculoskeletal:      Cervical back: Neck supple.   Lymphadenopathy:      Cervical: No cervical adenopathy.   Skin:     General: Skin is warm and dry.      Findings: No rash.      Nails: There is no clubbing.   Neurological:      Mental Status: He is alert and oriented to person, place, and time. "      Cranial Nerves: No cranial nerve deficit.      Coordination: Coordination normal.   Psychiatric:         Speech: Speech normal.         Behavior: Behavior normal.         Thought Content: Thought content normal.         Judgment: Judgment normal.            Result Review :     CMP    CMP 12/1/20 3/17/21 4/27/21   Glucose 191 (A) 313 (A) 110 (A)   BUN 25 (A) 19 19   Creatinine 1.57 (A) 1.27 1.34 (A)   eGFR Non African Am 46 (A) 58 55 (A)   Sodium 135 (A) 136 (A) 139   Potassium 4.6 5.1 (A) 4.3   Chloride 101 102 104   Calcium 9.5 9.5 8.8   Albumin 4.00 3.90 3.50   Total Bilirubin 0.9 0.6 0.4   Alkaline Phosphatase 65 83 88   AST (SGOT) 22 25 26   ALT (SGPT) 13 14 11   (A) Abnormal value            CBC w/diff    CBC w/Diff 12/1/20 3/17/21 4/27/21   WBC 6.98 9.51 6.58   RBC 3.93 (A) 4.17 4.18   Hemoglobin 11.2 (A) 12.3 (A) 12.0 (A)   Hematocrit 33.3 (A) 35.5 (A) 35.7 (A)   MCV 84.7 85.1 85.4   MCH 28.5 29.5 28.7   MCHC 33.6 34.6 33.6   RDW 13.4 12.4 12.7   Platelets 188 219 210   Neutrophil Rel % 64.5 64.8    Lymphocyte Rel % 22.8 21.7    Monocyte Rel % 8.3 7.6    Eosinophil Rel % 3.4 4.0    Basophil Rel % 1.0 1.9 (A)    (A) Abnormal value            Lipid Panel    Lipid Panel 7/16/20 12/1/20 3/17/21   Total Cholesterol 224 (A) 132 (A)    Triglycerides 372 (A) 152 (A)    HDL Cholesterol 39 (A) 53    VLDL Cholesterol 74.4 26    LDL Cholesterol  111 (A) 53 112 (A)   LDL/HDL Ratio 2.84 0.92    (A) Abnormal value            TSH    TSH 7/16/20   TSH 2.430           A1C Last 3 Results    HGBA1C Last 3 Results 3/17/21 3/27/21 4/1/21   Hemoglobin A1C 8.04 (A) 9.1 (A) 8.2 (A)   (A) Abnormal value            PSA    PSA 7/16/20   PSA 0.405           Data reviewed: Consultant notes PT notes            Assessment and Plan        Diagnoses and all orders for this visit:    1. Essential hypertension (Primary)  -     CBC Auto Differential; Future  -     Comprehensive Metabolic Panel; Future    2. Weakness of both lower  extremities    3. Cervical radiculopathy    4. Hyperlipidemia associated with type 2 diabetes mellitus (CMS/[facility])  -     Comprehensive Metabolic Panel; Future  -     Lipid Panel; Future    5. Mild nonproliferative diabetic retinopathy of both eyes without macular edema associated with type 2 diabetes mellitus (CMS/[facility])    6. Stage 3a chronic kidney disease (CMS/[facility])  -     Comprehensive Metabolic Panel; Future    7. Other dietary vitamin B12 deficiency anemia  -     Vitamin B12; Future    8. Cervical myelopathy with cervical radiculopathy (CMS/[facility]) - S/P C5-6 ACDF. Dr Hassan    9. Diabetic peripheral neuropathy associated with type 2 diabetes mellitus (CMS/[facility])    10. Type 2 diabetes mellitus with stage 3a chronic kidney disease, with long-term current use of insulin (CMS/[facility])  -     Comprehensive Metabolic Panel; Future  -     Hemoglobin A1c; Future  -     Microalbumin / Creatinine Urine Ratio - Urine, Clean Catch; Future  -     TSH; Future    11. Hypomagnesemia  -     Magnesium; Future    12. Special screening for malignant neoplasm of prostate  -     PSA Screen; Future    Other orders  -     Insulin Glargine, 2 Unit Dial, (Toujeo Max SoloStar) 300 UNIT/ML solution pen-injector injection; Inject 40 Units under the skin into the appropriate area as directed Every Evening. When glucose > 200  Dispense: 4 pen; Refill: 11  -     Cancel: Insulin Lispro, 1 Unit Dial, (HUMALOG) 100 UNIT/ML solution pen-injector; Inject 9 Units under the skin into the appropriate area as directed 3 (Three) Times a Day.  -     insulin aspart (novoLOG) 100 UNIT/ML injection; Inject 9 Units under the skin into the appropriate area as directed 3 (Three) Times a Day Before Meals.  Dispense: 3 each; Refill: 12      I spent 34 minutes caring for Wesly on this date of service. This time includes time spent by me in the following activities:preparing for the visit, reviewing tests, obtaining and/or reviewing a separately obtained history,  performing a medically appropriate examination and/or evaluation , counseling and educating the patient/family/caregiver, ordering medications, tests, or procedures, documenting information in the medical record, independently interpreting results and communicating that information with the patient/family/caregiver and care coordination     Patient continues to improve since  ACDF by Dr. Hassan last month and is very pleased with the improvement.   Continue the outpatient PT.  Fall risk and precautions discussed.  Denies falls since hospital discharge.      Continue losartan and metoprolol.  Monitor BP and notify me if not consistently at goal.     We will switch patient to Novolog  pen to start 5 units with meals gradually work up to 9 units if needed.  Continue th Toujeo 40 units q.d., weekly Ozempic and daily Farxiga 5 mg daily.  Intensify diabetic diet, exercise and weight loss efforts.  Exercise somewhat limited due to recent large central disc herniation C5-6 with ACDF with Dr. Hassan last month, although, he continues to improve with PT.  Keep appointment with Dr. Valerio/GL[facility] on 05/06/2021. I asked patient to try to monitor glucose twice daily.  We will be reviewing his glucose diary in one month.    Continue oral magnesium, at goal.       Continue oral B-12, at goal.      Return in 1 month for follow up on diabetic management with diabetic diary and 6 months for routine follow up with fasting labs one week prior or sooner if needed.     Scribed for Dr. Cadena by Yoko Johnson [facility].       Follow Up   Return in about 6 months (around 10/30/2021) for Follow up in six months with labs one week prior..  Patient was given instructions and counseling regarding his condition or for health maintenance advice. Please see specific information pulled into the AVS if appropriate.

## 2021-04-30 NOTE — PROGRESS NOTES
Chief Complaint  No chief complaint on file.    Subjective     {Problem List  Visit Diagnosis   Encounters  Notes  Medications  Labs  Result Review Imaging  Media :23}     Wesly Menezes presents to [facility]  History of Present Illness    Objective   Vital Signs:   There were no vitals taken for this visit.    Physical Exam   Result Review :{Labs  Result Review  Imaging  Med Tab  Media :23}   {The following data was reviewed by (Optional):63844}  {Ambulatory Labs (Optional):92708}  {Data reviewed (Optional):12721:::1}          Assessment and Plan {CC Problem List  Visit Diagnosis  ROS  Review (Popup)  Health Maintenance  Quality  BestPractice  Medications  SmartSets  SnapShot Encounters  Media :23}   There are no diagnoses linked to this encounter.  {Time Spent (Optional):90064}  Follow Up {Instructions Charge Capture  Follow-up Communications :23}  No follow-ups on file.  Patient was given instructions and counseling regarding his condition or for health maintenance advice. Please see specific information pulled into the AVS if appropriate.

## 2021-05-14 RX ORDER — ESCITALOPRAM OXALATE 20 MG/1
20 TABLET ORAL EVERY MORNING
Qty: 90 TABLET | Refills: 3 | Status: SHIPPED | OUTPATIENT
Start: 2021-05-14 | End: 2021-01-01

## 2021-05-14 RX ORDER — MIRTAZAPINE 30 MG/1
30 TABLET, FILM COATED ORAL NIGHTLY
Qty: 90 TABLET | Refills: 3 | Status: SHIPPED | OUTPATIENT
Start: 2021-05-14

## 2021-05-14 RX ORDER — METOPROLOL SUCCINATE 25 MG/1
25 TABLET, EXTENDED RELEASE ORAL DAILY
Qty: 90 TABLET | Refills: 3 | Status: SHIPPED | OUTPATIENT
Start: 2021-05-14

## 2021-06-02 ENCOUNTER — OFFICE VISIT (OUTPATIENT)
Dept: FAMILY MEDICINE CLINIC | Facility: CLINIC | Age: 56
End: 2021-06-02

## 2021-06-02 ENCOUNTER — LAB (OUTPATIENT)
Dept: LAB | Facility: OTHER | Age: 56
End: 2021-06-02

## 2021-06-02 VITALS
OXYGEN SATURATION: 99 % | BODY MASS INDEX: 28.88 KG/M2 | DIASTOLIC BLOOD PRESSURE: 54 MMHG | HEART RATE: 89 BPM | HEIGHT: 69 IN | SYSTOLIC BLOOD PRESSURE: 84 MMHG | WEIGHT: 195 LBS

## 2021-06-02 DIAGNOSIS — I95.9 HYPOTENSION, UNSPECIFIED HYPOTENSION TYPE: ICD-10-CM

## 2021-06-02 DIAGNOSIS — R11.2 NON-INTRACTABLE VOMITING WITH NAUSEA, UNSPECIFIED VOMITING TYPE: ICD-10-CM

## 2021-06-02 DIAGNOSIS — I95.9 HYPOTENSION, UNSPECIFIED HYPOTENSION TYPE: Primary | ICD-10-CM

## 2021-06-02 LAB
ALBUMIN SERPL-MCNC: 4.2 G/DL (ref 3.5–5)
ALBUMIN/GLOB SERPL: 1.4 G/DL (ref 1.1–1.8)
ALP SERPL-CCNC: 107 U/L (ref 38–126)
ALT SERPL W P-5'-P-CCNC: 14 U/L
ANION GAP SERPL CALCULATED.3IONS-SCNC: 12 MMOL/L (ref 5–15)
AST SERPL-CCNC: 26 U/L (ref 17–59)
BACTERIA UR QL AUTO: ABNORMAL /HPF
BASOPHILS # BLD AUTO: 0.06 10*3/MM3 (ref 0–0.2)
BASOPHILS NFR BLD AUTO: 0.6 % (ref 0–1.5)
BILIRUB SERPL-MCNC: 0.7 MG/DL (ref 0.2–1.3)
BILIRUB UR QL STRIP: ABNORMAL
BUN SERPL-MCNC: 46 MG/DL (ref 7–23)
BUN/CREAT SERPL: 10.5 (ref 7–25)
CALCIUM SPEC-SCNC: 9.7 MG/DL (ref 8.4–10.2)
CHLORIDE SERPL-SCNC: 95 MMOL/L (ref 101–112)
CLARITY UR: CLEAR
CO2 SERPL-SCNC: 29 MMOL/L (ref 22–30)
COLOR UR: YELLOW
CREAT SERPL-MCNC: 4.38 MG/DL (ref 0.7–1.3)
DEPRECATED RDW RBC AUTO: 41.8 FL (ref 37–54)
EOSINOPHIL # BLD AUTO: 0.61 10*3/MM3 (ref 0–0.4)
EOSINOPHIL NFR BLD AUTO: 5.9 % (ref 0.3–6.2)
ERYTHROCYTE [DISTWIDTH] IN BLOOD BY AUTOMATED COUNT: 13.7 % (ref 12.3–15.4)
GFR SERPL CREATININE-BSD FRML MDRD: 14 ML/MIN/1.73 (ref 56–130)
GFR SERPL CREATININE-BSD FRML MDRD: ABNORMAL ML/MIN/{1.73_M2}
GLOBULIN UR ELPH-MCNC: 3 GM/DL (ref 2.3–3.5)
GLUCOSE SERPL-MCNC: 183 MG/DL (ref 70–99)
GLUCOSE UR STRIP-MCNC: NEGATIVE MG/DL
HCT VFR BLD AUTO: 41.1 % (ref 37.5–51)
HGB BLD-MCNC: 14.3 G/DL (ref 13–17.7)
HGB UR QL STRIP.AUTO: ABNORMAL
HYALINE CASTS UR QL AUTO: ABNORMAL /LPF
KETONES UR QL STRIP: NEGATIVE
LEUKOCYTE ESTERASE UR QL STRIP.AUTO: NEGATIVE
LYMPHOCYTES # BLD AUTO: 3.07 10*3/MM3 (ref 0.7–3.1)
LYMPHOCYTES NFR BLD AUTO: 29.8 % (ref 19.6–45.3)
MCH RBC QN AUTO: 29.3 PG (ref 26.6–33)
MCHC RBC AUTO-ENTMCNC: 34.8 G/DL (ref 31.5–35.7)
MCV RBC AUTO: 84.2 FL (ref 79–97)
MONOCYTES # BLD AUTO: 0.91 10*3/MM3 (ref 0.1–0.9)
MONOCYTES NFR BLD AUTO: 8.8 % (ref 5–12)
NEUTROPHILS NFR BLD AUTO: 5.64 10*3/MM3 (ref 1.7–7)
NEUTROPHILS NFR BLD AUTO: 54.9 % (ref 42.7–76)
NITRITE UR QL STRIP: NEGATIVE
PH UR STRIP.AUTO: <=5 [PH] (ref 5.5–8)
PLATELET # BLD AUTO: 267 10*3/MM3 (ref 140–450)
PMV BLD AUTO: 9.7 FL (ref 6–12)
POTASSIUM SERPL-SCNC: 4.3 MMOL/L (ref 3.4–5)
PROT SERPL-MCNC: 7.2 G/DL (ref 6.3–8.6)
PROT UR QL STRIP: ABNORMAL
RBC # BLD AUTO: 4.88 10*6/MM3 (ref 4.14–5.8)
RBC # UR: ABNORMAL /HPF
REF LAB TEST METHOD: ABNORMAL
SODIUM SERPL-SCNC: 136 MMOL/L (ref 137–145)
SP GR UR STRIP: >=1.03 (ref 1–1.03)
SQUAMOUS #/AREA URNS HPF: ABNORMAL /HPF
UROBILINOGEN UR QL STRIP: ABNORMAL
WBC # BLD AUTO: 10.29 10*3/MM3 (ref 3.4–10.8)
WBC UR QL AUTO: ABNORMAL /HPF

## 2021-06-02 PROCEDURE — 82043 UR ALBUMIN QUANTITATIVE: CPT | Performed by: NURSE PRACTITIONER

## 2021-06-02 PROCEDURE — 85025 COMPLETE CBC W/AUTO DIFF WBC: CPT | Performed by: NURSE PRACTITIONER

## 2021-06-02 PROCEDURE — 81001 URINALYSIS AUTO W/SCOPE: CPT | Performed by: NURSE PRACTITIONER

## 2021-06-02 PROCEDURE — 82570 ASSAY OF URINE CREATININE: CPT | Performed by: NURSE PRACTITIONER

## 2021-06-02 PROCEDURE — 80053 COMPREHEN METABOLIC PANEL: CPT | Performed by: NURSE PRACTITIONER

## 2021-06-02 PROCEDURE — 99213 OFFICE O/P EST LOW 20 MIN: CPT | Performed by: NURSE PRACTITIONER

## 2021-06-02 RX ORDER — SEMAGLUTIDE 1.34 MG/ML
INJECTION, SOLUTION SUBCUTANEOUS
COMMUNITY
Start: 2021-05-14 | End: 2021-06-02 | Stop reason: CLARIF

## 2021-06-02 RX ORDER — DULAGLUTIDE 0.75 MG/.5ML
0.75 INJECTION, SOLUTION SUBCUTANEOUS
COMMUNITY
Start: 2021-05-14 | End: 2021-06-14

## 2021-06-02 RX ORDER — HYDRALAZINE HYDROCHLORIDE 10 MG/1
10 TABLET, FILM COATED ORAL 3 TIMES DAILY
COMMUNITY
Start: 2021-05-06 | End: 2022-01-01

## 2021-06-03 LAB
ALBUMIN UR-MCNC: 23.5 MG/DL
CREAT UR-MCNC: 434.5 MG/DL
MICROALBUMIN/CREAT UR: 54.1 MG/G

## 2021-06-04 NOTE — PROGRESS NOTES
"Subjective   Wesly Menezes is a 55 y.o. male who presents to the office complaining of low blood pressure over the past two days even though he hasn't had any of his three HTN medications.  Tells me  He has fallen five times in the past two days, feels \"swimmy headed\" prior to falling.  Had neck surgery nine weeks ago by neurosurg Mo due to bulging discs.  Neck pain is greater today than last week.  Has vomited the past two nights but feels better today.  Denies diarrhea.  Is continuing to go to [facility] but cancelled that today as well as his followup yesterday with Mo because he felt so bad.  Is drinking fluids.  Labs drawn on 5/6, last visit with PCP Surinder on 4/30.  Denies any abnormal bleeding.  Has only taken Toujeo for diabetes.  Glucose this morning was 187.    History of Present Illness     The following portions of the patient's history were reviewed and updated as appropriate: allergies, current medications, past family history, past medical history, past social history, past surgical history and problem list.    Review of Systems   Constitutional: Negative for chills, fatigue and fever.   HENT: Negative for congestion, sneezing, sore throat and trouble swallowing.    Eyes: Negative for visual disturbance.   Respiratory: Negative for cough, chest tightness, shortness of breath and wheezing.    Cardiovascular: Negative for chest pain, palpitations and leg swelling.   Gastrointestinal: Positive for nausea and vomiting. Negative for abdominal pain, constipation and diarrhea.   Genitourinary: Negative for dysuria, frequency and urgency.   Musculoskeletal: Negative for neck pain.   Skin: Negative for rash.   Neurological: Positive for dizziness and light-headedness. Negative for weakness and headaches.   Psychiatric/Behavioral:        In the past two weeks the pt has not felt down, depressed, hopeless or lost interest in doing things   All other systems reviewed and are negative.      Objective   Physical " Exam  Vitals and nursing note reviewed.   Constitutional:       Appearance: He is well-developed. He is not toxic-appearing or diaphoretic.   HENT:      Head: Normocephalic and atraumatic.      Right Ear: Tympanic membrane and external ear normal.      Left Ear: Tympanic membrane and external ear normal.      Nose: Nose normal.   Eyes:      General: Lids are normal. No scleral icterus.        Right eye: No discharge.         Left eye: No discharge.      Conjunctiva/sclera: Conjunctivae normal.      Pupils: Pupils are equal, round, and reactive to light.   Neck:      Thyroid: No thyromegaly.   Cardiovascular:      Rate and Rhythm: Normal rate and regular rhythm.      Heart sounds: Normal heart sounds. No murmur heard.   No friction rub. No gallop.       Comments: BP readings:  Laying 102/74, sitting 100/70 and standing 82/64 and glucose is 195  Pulmonary:      Effort: Pulmonary effort is normal. No respiratory distress.      Breath sounds: Normal breath sounds. No wheezing or rales.   Abdominal:      General: Bowel sounds are normal. There is no distension.      Palpations: Abdomen is soft.      Tenderness: There is no abdominal tenderness. There is no guarding or rebound.   Musculoskeletal:         General: Normal range of motion.      Cervical back: Normal range of motion and neck supple. Pain with movement (with neck flexion) present.   Lymphadenopathy:      Cervical: No cervical adenopathy.   Skin:     General: Skin is warm and dry.      Capillary Refill: Capillary refill takes less than 2 seconds.      Findings: No rash.      Comments: Gray facial tone   Neurological:      Mental Status: He is alert and oriented to person, place, and time.      Cranial Nerves: No cranial nerve deficit.   Psychiatric:         Behavior: Behavior normal.         Thought Content: Thought content normal.         Judgment: Judgment normal.         Assessment/Plan   Diagnoses and all orders for this visit:    1. Hypotension,  unspecified hypotension type (Primary)  -     CBC & Differential; Future  -     Comprehensive Metabolic Panel; Future  -     Urinalysis With Culture If Indicated -; Future  -     Microalbumin / Creatinine Urine Ratio - Urine, Clean Catch; Future    2. Non-intractable vomiting with nausea, unspecified vomiting type    Will hold all BP meds.  Will send for CBC, CMP and urinalysis as well.    He will check his blood pressure three times daily, making slow position changes. Has a cane at home and he will use that for ambulation assistance.  Will call with lab results on Wednesday afternoon as they have been entered as STAT.  Update:  Labs received late Wednesday afternoon with acute renal failure of BUN 46.  Patient was encouraged to go to ED for medical evaluation and possible admission.             This document has been electronically signed by GLEN Jin on June 4, 2021 07:17 CDT

## 2021-06-07 ENCOUNTER — OFFICE VISIT (OUTPATIENT)
Dept: FAMILY MEDICINE CLINIC | Facility: CLINIC | Age: 56
End: 2021-06-07

## 2021-06-07 ENCOUNTER — LAB (OUTPATIENT)
Dept: LAB | Facility: OTHER | Age: 56
End: 2021-06-07

## 2021-06-07 VITALS
HEART RATE: 80 BPM | WEIGHT: 200.2 LBS | TEMPERATURE: 98 F | SYSTOLIC BLOOD PRESSURE: 180 MMHG | BODY MASS INDEX: 29.65 KG/M2 | HEIGHT: 69 IN | DIASTOLIC BLOOD PRESSURE: 90 MMHG

## 2021-06-07 DIAGNOSIS — I95.1 ORTHOSTATIC HYPOTENSION: ICD-10-CM

## 2021-06-07 DIAGNOSIS — A08.4 VIRAL GASTROENTERITIS: ICD-10-CM

## 2021-06-07 DIAGNOSIS — I10 ESSENTIAL HYPERTENSION: Chronic | ICD-10-CM

## 2021-06-07 DIAGNOSIS — N17.9 AKI (ACUTE KIDNEY INJURY) (HCC): ICD-10-CM

## 2021-06-07 DIAGNOSIS — E83.42 HYPOMAGNESEMIA: Chronic | ICD-10-CM

## 2021-06-07 DIAGNOSIS — Z79.4 TYPE 2 DIABETES MELLITUS WITH DIABETIC POLYNEUROPATHY, WITH LONG-TERM CURRENT USE OF INSULIN (HCC): Chronic | ICD-10-CM

## 2021-06-07 DIAGNOSIS — E86.0 DEHYDRATION: ICD-10-CM

## 2021-06-07 DIAGNOSIS — R53.1 WEAKNESS: ICD-10-CM

## 2021-06-07 DIAGNOSIS — Z09 HOSPITAL DISCHARGE FOLLOW-UP: Primary | ICD-10-CM

## 2021-06-07 DIAGNOSIS — E78.5 HYPERLIPIDEMIA ASSOCIATED WITH TYPE 2 DIABETES MELLITUS (HCC): Chronic | ICD-10-CM

## 2021-06-07 DIAGNOSIS — E11.42 TYPE 2 DIABETES MELLITUS WITH DIABETIC POLYNEUROPATHY, WITH LONG-TERM CURRENT USE OF INSULIN (HCC): Chronic | ICD-10-CM

## 2021-06-07 DIAGNOSIS — E11.69 HYPERLIPIDEMIA ASSOCIATED WITH TYPE 2 DIABETES MELLITUS (HCC): Chronic | ICD-10-CM

## 2021-06-07 LAB
ANION GAP SERPL CALCULATED.3IONS-SCNC: 4 MMOL/L (ref 5–15)
BUN SERPL-MCNC: 24 MG/DL (ref 7–23)
BUN/CREAT SERPL: 17.6 (ref 7–25)
CALCIUM SPEC-SCNC: 9 MG/DL (ref 8.4–10.2)
CHLORIDE SERPL-SCNC: 103 MMOL/L (ref 101–112)
CO2 SERPL-SCNC: 29 MMOL/L (ref 22–30)
CREAT SERPL-MCNC: 1.36 MG/DL (ref 0.7–1.3)
GFR SERPL CREATININE-BSD FRML MDRD: 54 ML/MIN/1.73 (ref 56–130)
GLUCOSE SERPL-MCNC: 330 MG/DL (ref 70–99)
MAGNESIUM SERPL-MCNC: 1.6 MG/DL (ref 1.6–2.3)
POTASSIUM SERPL-SCNC: 4.8 MMOL/L (ref 3.4–5)
SODIUM SERPL-SCNC: 136 MMOL/L (ref 137–145)

## 2021-06-07 PROCEDURE — 80048 BASIC METABOLIC PNL TOTAL CA: CPT | Performed by: INTERNAL MEDICINE

## 2021-06-07 PROCEDURE — 83735 ASSAY OF MAGNESIUM: CPT | Performed by: INTERNAL MEDICINE

## 2021-06-07 PROCEDURE — 99214 OFFICE O/P EST MOD 30 MIN: CPT | Performed by: INTERNAL MEDICINE

## 2021-06-07 NOTE — PROGRESS NOTES
Chief Complaint  Follow-up (hospital for f/u surgery. He has also seen Maria Teresa Reilly and ER for vertigo and falling. His b/p yesterday was 120/80 and he continues to hold b/p meds)    Subjective          History of Present Illness     Wesly Menezes is our 55-year-old male with chronic medical issues including type 2 diabetes, hyperlipidemia, hypertension, depression/OFELIA, and other medical issues.  Patient recently underwent C5-6 ACDF with Dr. Hassan 3/29/2021 for treatment of large central disc herniation C5-6.  He continues physical therapy reporting a 75% improvement in right leg and arm weakness.      Patient presents to the clinic today for hospital follow up.  He was seen by GLEN Greenberg, on 06/02/2021 with hypotensive episodes/orthostatic dizziness resulting in falls despite holding his BP medications.  When he was seen by Maria Teresa Reilly, he was recovering from viral gastroenteritis symptoms, nausea diarrhea and vomiting, and had not been monitoring his glucose while sick and not taking his diabetic meds regularly while sick.  Labs revealed SELENA due to dehydration with creatinine 4.38, for which patient was instructed to go to ER.  He was seen in the ER at [facility] 06/02/2021 where he received 2 L IV fluids and was admitted.   Blood pressure was stable at rest, although, orthostatic on admission with blood pressure standing 83/53.  The following morning, BUN and creatinine had improved to 41 and 2.7 and he was able to get up and ambulate without dizziness.  Losartan and hydralazine metoprolol was held.  On discharge, BUN and creatinine had improved to 26 and 1.4.   He was to continue holding losartan, hydralazine, and Toprol. His statin was also held due to SELENA.  Blood pressure today is now very elevated without his blood pressure medications.    He reports GI symptoms have totally resolved. His glucose was 184 with hospital admission.  Home diabetic medications Farxiga, Trulicity, Toujeo were held on discharge.  His  "glucose has been running above 200 over the weekend.             Objective   Vital Signs:   /90 Comment: 170/80 right arm  Pulse 80   Temp 98 °F (36.7 °C) (Tympanic)   Ht 175.3 cm (69\")   Wt 90.8 kg (200 lb 3.2 oz)   BMI 29.56 kg/m²       Physical Exam  Vitals reviewed.   Constitutional:       General: He is not in acute distress.     Appearance: He is well-developed.      Comments: Pleasant male, overweight.     HENT:      Head: Normocephalic and atraumatic.      Nose:      Right Sinus: No maxillary sinus tenderness or frontal sinus tenderness.      Left Sinus: No maxillary sinus tenderness or frontal sinus tenderness.      Mouth/Throat:      Mouth: No oral lesions.      Pharynx: Uvula midline.      Tonsils: No tonsillar exudate.   Eyes:      Conjunctiva/sclera: Conjunctivae normal.      Pupils: Pupils are equal, round, and reactive to light.   Neck:      Thyroid: No thyroid mass or thyromegaly.      Vascular: No carotid bruit or JVD.      Trachea: Trachea normal. No tracheal deviation.   Cardiovascular:      Rate and Rhythm: Normal rate and regular rhythm.  No extrasystoles are present.     Chest Wall: PMI is not displaced.      Heart sounds: Normal heart sounds. No murmur heard.     Pulmonary:      Effort: Pulmonary effort is normal. No accessory muscle usage or respiratory distress.      Breath sounds: Normal breath sounds. No decreased breath sounds, wheezing, rhonchi or rales.   Abdominal:      General: Bowel sounds are normal. There is no distension.      Palpations: Abdomen is soft.      Tenderness: There is no abdominal tenderness.   Musculoskeletal:      Cervical back: Neck supple.   Lymphadenopathy:      Cervical: No cervical adenopathy.   Skin:     General: Skin is warm and dry.      Findings: No rash.      Nails: There is no clubbing.      Comments: Healing abrasions on left lower extremity.        Neurological:      Mental Status: He is alert and oriented to person, place, and time.      " Cranial Nerves: No cranial nerve deficit.      Coordination: Coordination normal.   Psychiatric:         Speech: Speech normal.         Behavior: Behavior normal.         Thought Content: Thought content normal.         Judgment: Judgment normal.            Result Review :     CMP    CMP 4/27/21 6/2/21 6/2/21 6/7/21     1647 2100    Glucose 110 (A) 183 (A)  330 (A)   BUN 19 46 (A)  24 (A)   Creatinine 1.34 (A) 4.38 (A)  1.36 (A)   eGFR Non  Am 55 (A) 14 (A)  54 (A)   eGFR African Am       Sodium 139 136 (A) 137 136 (A)   Potassium 4.3 4.3  4.8   Chloride 104 95 (A)  103   Calcium 8.8 9.7  9.0   Albumin 3.50 4.20     Total Bilirubin 0.4 0.7     Alkaline Phosphatase 88 107     AST (SGOT) 26 26     ALT (SGPT) 11 14     (A) Abnormal value       Comments are available for some flowsheets but are not being displayed.           CBC w/diff    CBC w/Diff 3/17/21 4/27/21 6/2/21   WBC 9.51 6.58 10.29   RBC 4.17 4.18 4.88   Hemoglobin 12.3 (A) 12.0 (A) 14.3   Hematocrit 35.5 (A) 35.7 (A) 41.1   MCV 85.1 85.4 84.2   MCH 29.5 28.7 29.3   MCHC 34.6 33.6 34.8   RDW 12.4 12.7 13.7   Platelets 219 210 267   Neutrophil Rel % 64.8  54.9   Lymphocyte Rel % 21.7  29.8   Monocyte Rel % 7.6  8.8   Eosinophil Rel % 4.0  5.9   Basophil Rel % 1.9 (A)  0.6   (A) Abnormal value            Lipid Panel    Lipid Panel 7/16/20 12/1/20 3/17/21   Total Cholesterol 224 (A) 132 (A)    Triglycerides 372 (A) 152 (A)    HDL Cholesterol 39 (A) 53    VLDL Cholesterol 74.4 26    LDL Cholesterol  111 (A) 53 112 (A)   LDL/HDL Ratio 2.84 0.92    (A) Abnormal value            TSH    TSH 7/16/20   TSH 2.430           A1C Last 3 Results    HGBA1C Last 3 Results 3/17/21 3/27/21 4/1/21   Hemoglobin A1C 8.04 (A) 9.1 (A) 8.2 (A)   (A) Abnormal value            Data reviewed: Recent hospitalization notes [facility] 06/02/2021-06/04/2021         Future Appointments   Date Time Provider Department Center   11/5/2021  8:30 AM Jono Cadena MD [facility]          Assessment and Plan        Diagnoses and all orders for this visit:    1. Hospital discharge follow-up (Primary)    2. Dehydration  -     Basic Metabolic Panel; Future    3. SELENA (acute kidney injury) (CMS/[facility])  -     Basic Metabolic Panel; Future    4. Hypomagnesemia  -     Magnesium; Future    5. Viral gastroenteritis    6. Type 2 diabetes mellitus with diabetic polyneuropathy, with long-term current use of insulin (CMS/[facility])    7. Weakness    8. Essential hypertension    9. Orthostatic hypotension    10. Hyperlipidemia associated with type 2 diabetes mellitus (CMS/[facility])      I spent 31 minutes caring for Wesly on this date of service. This time includes time spent by me in the following activities:preparing for the visit, reviewing tests, obtaining and/or reviewing a separately obtained history, performing a medically appropriate examination and/or evaluation , counseling and educating the patient/family/caregiver, ordering medications, tests, or procedures, referring and communicating with other health care professionals , documenting information in the medical record, independently interpreting results and communicating that information with the patient/family/caregiver and care coordination     Renal function had returned to baseline with hospital discharge.  Orders are placed for patient to have BMP and magnesium.  We will notify patient with results.   Continue to hydrate well.       Resume the diabetic medications. I explained the importance of close monitoring of glucose, especially during sick days.  Sick day rules are reviewed.  Notify me if glucose is not controlled    Resume the pravastatin.    Restart BP medications and monitor.  Notify me if BP is not consistently at goal.       Continue the outpatient PT.  Fall risk and precautions discussed.  I encouraged him to also use some free weights regularly at home to help regain strength, particularly in the right arm.    Return 11/05/2021 for routine  follow up on chronic medical issues with fasting labs one week prior or sooner if needed.      Current outpatient and discharge medications have been reconciled for the patient.  Reviewed by: Jono Cadena MD      Scribed for Dr. Cadena by Weston Frye.     Follow Up   Return if symptoms worsen or fail to improve, for Next scheduled follow up, Next scheduled follow up - labs 1 week prior.  Patient was given instructions and counseling regarding his condition or for health maintenance advice. Please see specific information pulled into the AVS if appropriate.

## 2021-06-08 RX ORDER — OMEPRAZOLE 40 MG/1
40 CAPSULE, DELAYED RELEASE ORAL DAILY
Qty: 90 CAPSULE | Refills: 3 | Status: SHIPPED | OUTPATIENT
Start: 2021-06-08

## 2021-06-18 DIAGNOSIS — E11.42 TYPE 2 DIABETES MELLITUS WITH DIABETIC POLYNEUROPATHY, WITHOUT LONG-TERM CURRENT USE OF INSULIN (HCC): Chronic | ICD-10-CM

## 2021-06-18 DIAGNOSIS — E11.42 DIABETIC PERIPHERAL NEUROPATHY ASSOCIATED WITH TYPE 2 DIABETES MELLITUS (HCC): Chronic | ICD-10-CM

## 2021-06-18 RX ORDER — GABAPENTIN 600 MG/1
TABLET ORAL
Qty: 90 TABLET | Refills: 0 | Status: SHIPPED | OUTPATIENT
Start: 2021-06-18 | End: 2021-01-01 | Stop reason: SDUPTHER

## 2021-06-18 RX ORDER — NAPROXEN 500 MG/1
500 TABLET ORAL 2 TIMES DAILY WITH MEALS
Qty: 180 TABLET | Refills: 1 | Status: SHIPPED | OUTPATIENT
Start: 2021-06-18

## 2021-10-29 NOTE — PROGRESS NOTES
"You have chosen to receive care through a telephone visit. Do you consent to use a telephone visit for your medical care today? Yes    Chief Complaint  Med Refill (Gabapentin and wants to talk about pain meds. He states he cant reach his neurosurgeons ), Neck Pain, and Diabetes    Subjective          History of Present Illness     Wesly Menezes is our 56-year-old patient with multiple issues including diabetes and diabetic neuropathy.  He continues on Neurontin 600 mg 1 tablet in the evening and 1/2 tablet mid day for diabetic neuropathy, which he is tolerating well.  Denies side effects with the Neurontin including excessive daytime sedation.      He had cervical myelopathy and underwent C5-6 ACDF with Dr. Hassan 3/29/2021 for treatment of large central disc herniation C5-6 resulting severe central canal stenosis and myelopathy.after experiencing hand paresthesias, right hand spasm, and RUE and BLE weakness.  He is reporting constant neck pain/pressure.  He states he has been unable to get in touch with Bunny Hassan despite leaving multiple messages.         He is due fasting labs on his chronic medical issues, which includes diabetes, hypertension, hyperlipidemia and chronic kidney disease.  He has been seeing GLEN Munson at the [facility] in [place].  He has had some blood work done there, but not sure the extent of that.  He is noncommittal as who he wants to follow these and is other chronic medical issues.         Objective   Vital Signs:   Ht 175.3 cm (69\")   Wt 90.7 kg (200 lb)   BMI 29.53 kg/m²         Physical Exam   Result Review :     Common labs    Common Labsle 9/8/21 9/9/21 9/10/21   Glucose 191 (A) 163 (A) 220 (A)   BUN 19 (A) 18 21 (A)   Creatinine 1.4 (A) 1.3 1.5 (A)   eGFR African Am 63 69 58   Sodium 141 142 144   Potassium 3.4 (A) 3.5 3.7   Chloride 108 (A) 107 108 (A)   Calcium 7.8 (A) 7.9 (A) 8.3 (A)   Albumin 3.0 (A) 3.0 (A) 3.2 (A)   Total Bilirubin 0.70 0.80 1.00 "   Alkaline Phosphatase 98 91 93   AST (SGOT) 29 26 25   ALT (SGPT) 44 37 32   (A) Abnormal value       Comments are available for some flowsheets but are not being displayed.           Data reviewed: Recent hospitalization notes [facility] discharge summary         Assessment and Plan    Diagnoses and all orders for this visit:    1. Diabetic peripheral neuropathy associated with type 2 diabetes mellitus (HCC)  -     gabapentin (NEURONTIN) 600 MG tablet; 1/2 tablet each afternoon and 1 tablet each evening for diabetic neuropathy pain  Dispense: 135 tablet; Refill: 0         I spent 22 minutes caring for Wesly on this date of service. This time includes time spent by me in the following activities:preparing for the visit, reviewing tests, obtaining and/or reviewing a separately obtained history, counseling and educating the patient/family/caregiver, ordering medications, tests, or procedures, documenting information in the medical record and independently interpreting results and communicating that information with the patient/family/caregiver     Refill sent for Neurontin 600 mg to take 1/2 tablet mid-day and one tablet q.h.s. Patient understands the risks associated with this controlled medication, including tolerance and addiction.  He also agrees to only obtain this medication from me, and not from a another provider, unless that provider is covering for me in my absence.  He also agrees to be compliant in dosing, and not self adjust the dose of medication.  A signed controlled substance agreement is on file, and he has received a controlled substance education sheet at this a previous visit.  He has also signed a consent for treatment with a controlled substance as per [facility] policy. JOHNNY was obtained.    It appears patient will be seeing another provider to follow routine medical issues, but he has not fully decided.  I will leave his lab orders in the computer but canceled his follow-up  appointment for next week.  He can reschedule this if he wants us to continue managing these issues.  I urged him to be very compliant with follow-up with someone to manage these chronic medical issues.  I recommended he discuss the neck pain and pressure with his neurosurgeon, Dr. Hassan.  If he is going to be using someone else for his primary care, he will need to get that individual to refill his Neurontin in the future or refer him to pain management.    Scribed for Dr. Cadena by Yoko Johnson [facility].     Follow Up   No follow-ups on file.  Patient was given instructions and counseling regarding his condition or for health maintenance advice. Please see specific information pulled into the AVS if appropriate.

## 2022-01-01 ENCOUNTER — TRANSCRIBE ORDERS (OUTPATIENT)
Dept: LAB | Facility: OTHER | Age: 57
End: 2022-01-01

## 2022-01-01 DIAGNOSIS — N18.9 CHRONIC KIDNEY DISEASE, UNSPECIFIED CKD STAGE: Primary | ICD-10-CM
